# Patient Record
Sex: FEMALE | Race: WHITE | NOT HISPANIC OR LATINO | Employment: OTHER | URBAN - METROPOLITAN AREA
[De-identification: names, ages, dates, MRNs, and addresses within clinical notes are randomized per-mention and may not be internally consistent; named-entity substitution may affect disease eponyms.]

---

## 2017-11-13 ENCOUNTER — HOSPITAL ENCOUNTER (OUTPATIENT)
Dept: MAMMOGRAPHY | Facility: HOSPITAL | Age: 57
Discharge: HOME | End: 2017-11-13
Attending: FAMILY MEDICINE | Admitting: FAMILY MEDICINE

## 2019-02-12 ENCOUNTER — HOSPITAL ENCOUNTER (OUTPATIENT)
Dept: OTHER | Facility: HOSPITAL | Age: 59
Discharge: HOME | End: 2019-02-12
Attending: FAMILY MEDICINE | Admitting: FAMILY MEDICINE

## 2019-02-13 ENCOUNTER — CONVERSION ENCOUNTER (OUTPATIENT)
Dept: FAMILY MEDICINE CLINIC | Facility: CLINIC | Age: 59
End: 2019-02-13

## 2019-02-14 LAB
ALBUMIN SERPL-MCNC: 4.5 G/DL (ref 3.6–5.1)
ALP SERPL-CCNC: 71 U/L (ref 33–130)
AST SERPL-CCNC: 20 U/L (ref 10–35)
BILIRUB SERPL-MCNC: 0.7 MG/DL (ref 0.2–1.2)
BUN SERPL-MCNC: 16 MG/DL (ref 7–25)
BUN/CREAT SERPL: 20 (CALC) (ref 6–22)
CALCIUM SERPL-MCNC: 9.7 MG/DL (ref 8.6–10.2)
CHLORIDE SERPL-SCNC: 104 MMOL/L (ref 98–110)
CHOLEST SERPL-MCNC: 196 MG/DL (ref 125–200)
CONV CO2: 23 MMOL/L (ref 21–33)
CONV TOTAL PROTEIN: 7 G/DL (ref 6.2–8.3)
CREAT UR-MCNC: 0.8 MG/DL (ref 0.6–1.1)
GLOBULIN UR ELPH-MCNC: 2.5 MG/DL (ref 2.2–3.9)
GLUCOSE UR QL: 97 MG/DL (ref 65–99)
HDLC SERPL-MCNC: 53 MG/DL
INSULIN SERPL-ACNC: 1.8 (CALC) (ref 1–2.1)
LDLC SERPL CALC-MCNC: 118 MG/DL
POTASSIUM SERPL-SCNC: 4.2 MMOL/L (ref 3.5–5.3)
SODIUM SERPL-SCNC: 138 MMOL/L (ref 135–146)
TRIGL SERPL-MCNC: 124 MG/DL
VIT B12 SERPL-MCNC: 418 PG/ML (ref 200–1100)

## 2019-07-10 ENCOUNTER — OFFICE VISIT (OUTPATIENT)
Dept: FAMILY MEDICINE CLINIC | Facility: CLINIC | Age: 59
End: 2019-07-10

## 2019-07-10 VITALS
SYSTOLIC BLOOD PRESSURE: 112 MMHG | BODY MASS INDEX: 35.06 KG/M2 | WEIGHT: 178.6 LBS | RESPIRATION RATE: 18 BRPM | TEMPERATURE: 98.7 F | HEIGHT: 60 IN | DIASTOLIC BLOOD PRESSURE: 78 MMHG | OXYGEN SATURATION: 98 % | HEART RATE: 87 BPM

## 2019-07-10 DIAGNOSIS — J20.9 ACUTE BRONCHITIS, UNSPECIFIED ORGANISM: Primary | ICD-10-CM

## 2019-07-10 PROCEDURE — 99213 OFFICE O/P EST LOW 20 MIN: CPT | Performed by: FAMILY MEDICINE

## 2019-07-10 RX ORDER — CEPHALEXIN 500 MG/1
500 CAPSULE ORAL 3 TIMES DAILY
Qty: 30 CAPSULE | Refills: 0 | Status: SHIPPED | OUTPATIENT
Start: 2019-07-10 | End: 2019-07-20

## 2019-07-10 RX ORDER — GABAPENTIN 100 MG/1
100 CAPSULE ORAL DAILY
Refills: 3 | COMMUNITY
Start: 2019-06-30 | End: 2019-11-04 | Stop reason: SDUPTHER

## 2019-07-10 RX ORDER — BENZONATATE 100 MG/1
200 CAPSULE ORAL 3 TIMES DAILY PRN
Qty: 30 CAPSULE | Refills: 0 | Status: SHIPPED | OUTPATIENT
Start: 2019-07-10 | End: 2021-02-23

## 2019-07-10 RX ORDER — CHOLECALCIFEROL (VITAMIN D3) 125 MCG
500 CAPSULE ORAL DAILY
COMMUNITY
Start: 2019-07-10

## 2019-07-10 RX ORDER — SIMVASTATIN 20 MG
20 TABLET ORAL EVERY EVENING
Refills: 4 | COMMUNITY
Start: 2019-06-06 | End: 2019-11-07 | Stop reason: DRUGHIGH

## 2019-07-10 RX ORDER — ALBUTEROL SULFATE 90 UG/1
2 AEROSOL, METERED RESPIRATORY (INHALATION) EVERY 4 HOURS PRN
Refills: 2 | COMMUNITY
Start: 2019-04-05 | End: 2019-11-04 | Stop reason: SDUPTHER

## 2019-07-10 RX ORDER — ALBUTEROL SULFATE 2.5 MG/3ML
1 SOLUTION RESPIRATORY (INHALATION) 4 TIMES DAILY PRN
COMMUNITY
Start: 2019-05-16 | End: 2020-03-03

## 2019-07-16 DIAGNOSIS — K21.9 GASTROESOPHAGEAL REFLUX DISEASE WITHOUT ESOPHAGITIS: Primary | ICD-10-CM

## 2019-07-16 RX ORDER — OMEPRAZOLE 20 MG/1
20 CAPSULE, DELAYED RELEASE ORAL DAILY
Qty: 30 CAPSULE | Refills: 12 | Status: SHIPPED | OUTPATIENT
Start: 2019-07-16 | End: 2019-11-04 | Stop reason: SDUPTHER

## 2019-11-04 ENCOUNTER — OFFICE VISIT (OUTPATIENT)
Dept: FAMILY MEDICINE CLINIC | Facility: CLINIC | Age: 59
End: 2019-11-04

## 2019-11-04 VITALS
BODY MASS INDEX: 33 KG/M2 | SYSTOLIC BLOOD PRESSURE: 130 MMHG | DIASTOLIC BLOOD PRESSURE: 72 MMHG | OXYGEN SATURATION: 95 % | WEIGHT: 174.8 LBS | HEART RATE: 102 BPM | RESPIRATION RATE: 18 BRPM | HEIGHT: 61 IN | TEMPERATURE: 97.2 F

## 2019-11-04 DIAGNOSIS — E78.2 MIXED HYPERLIPIDEMIA: ICD-10-CM

## 2019-11-04 DIAGNOSIS — F17.200 TOBACCO USE DISORDER: ICD-10-CM

## 2019-11-04 DIAGNOSIS — Z00.01 ANNUAL VISIT FOR GENERAL ADULT MEDICAL EXAMINATION WITH ABNORMAL FINDINGS: ICD-10-CM

## 2019-11-04 DIAGNOSIS — K21.9 GASTROESOPHAGEAL REFLUX DISEASE WITHOUT ESOPHAGITIS: ICD-10-CM

## 2019-11-04 DIAGNOSIS — J41.0 SIMPLE CHRONIC BRONCHITIS (HCC): Primary | ICD-10-CM

## 2019-11-04 DIAGNOSIS — Z78.0 ASYMPTOMATIC MENOPAUSAL STATE: ICD-10-CM

## 2019-11-04 DIAGNOSIS — M17.11 OSTEOARTHRITIS OF RIGHT KNEE, UNSPECIFIED OSTEOARTHRITIS TYPE: ICD-10-CM

## 2019-11-04 DIAGNOSIS — Z11.59 NEED FOR HEPATITIS C SCREENING TEST: ICD-10-CM

## 2019-11-04 DIAGNOSIS — Z12.39 ENCOUNTER FOR SCREENING FOR MALIGNANT NEOPLASM OF BREAST: ICD-10-CM

## 2019-11-04 DIAGNOSIS — R92.2 DENSE BREAST: ICD-10-CM

## 2019-11-04 DIAGNOSIS — Z12.11 COLON CANCER SCREENING: ICD-10-CM

## 2019-11-04 DIAGNOSIS — Z12.4 CERVICAL CANCER SCREENING: ICD-10-CM

## 2019-11-04 PROBLEM — R92.30 DENSE BREAST: Status: ACTIVE | Noted: 2019-11-04

## 2019-11-04 LAB
BILIRUB BLD-MCNC: NEGATIVE MG/DL
CLARITY, POC: CLEAR
COLOR UR: YELLOW
GLUCOSE UR STRIP-MCNC: NEGATIVE MG/DL
KETONES UR QL: NEGATIVE
LEUKOCYTE EST, POC: NEGATIVE
NITRITE UR-MCNC: NEGATIVE MG/ML
PH UR: 8 [PH] (ref 5–8)
PROT UR STRIP-MCNC: NEGATIVE MG/DL
RBC # UR STRIP: NEGATIVE /UL
SP GR UR: 1 (ref 1–1.03)
UROBILINOGEN UR QL: NORMAL

## 2019-11-04 PROCEDURE — 81002 URINALYSIS NONAUTO W/O SCOPE: CPT | Performed by: FAMILY MEDICINE

## 2019-11-04 PROCEDURE — 99212 OFFICE O/P EST SF 10 MIN: CPT | Performed by: FAMILY MEDICINE

## 2019-11-04 PROCEDURE — 99396 PREV VISIT EST AGE 40-64: CPT | Performed by: FAMILY MEDICINE

## 2019-11-04 RX ORDER — OMEPRAZOLE 20 MG/1
20 CAPSULE, DELAYED RELEASE ORAL DAILY
Qty: 90 CAPSULE | Refills: 3 | Status: SHIPPED | OUTPATIENT
Start: 2019-11-04 | End: 2020-08-03

## 2019-11-04 RX ORDER — GABAPENTIN 100 MG/1
100 CAPSULE ORAL DAILY
Qty: 90 CAPSULE | Refills: 3 | Status: SHIPPED | OUTPATIENT
Start: 2019-11-04 | End: 2020-12-11

## 2019-11-04 RX ORDER — ALBUTEROL SULFATE 90 UG/1
2 AEROSOL, METERED RESPIRATORY (INHALATION) EVERY 4 HOURS PRN
Qty: 3 INHALER | Refills: 2 | Status: SHIPPED | OUTPATIENT
Start: 2019-11-04 | End: 2021-04-12 | Stop reason: SDUPTHER

## 2019-11-04 RX ORDER — CHLORAL HYDRATE 500 MG
1000 CAPSULE ORAL
COMMUNITY
End: 2020-12-17

## 2019-11-04 NOTE — PROGRESS NOTES
Subjective   Tara Cisse is a 58 y.o. female.     Chief Complaint   Patient presents with   • Annual Exam       The patient is here: for coordination of medical care to discuss health maintenance and disease prevention. Overall has: moderate activity with work/home activities, good appetite, feels well with no complaints, good energy level and is sleeping well. Nutrition: inappropriate diet. Last tetanus shot was unknown.     Hyperlipidemia   This is a chronic problem. The current episode started more than 1 year ago. Recent lipid tests were reviewed and are low. Exacerbating diseases include obesity. She has no history of diabetes. Pertinent negatives include no chest pain, myalgias or shortness of breath. Current antihyperlipidemic treatment includes statins. The current treatment provides significant improvement of lipids. Risk factors for coronary artery disease include dyslipidemia, obesity, post-menopausal and family history.   Heartburn   She reports no abdominal pain, no chest pain, no coughing, no nausea or no wheezing. This is a chronic problem. The problem occurs occasionally. The problem has been resolved. The symptoms are aggravated by certain foods. Pertinent negatives include no weight loss. There are no known risk factors. She has tried a PPI for the symptoms. The treatment provided significant relief.   COPD   There is no cough, shortness of breath or wheezing. This is a chronic problem. The current episode started more than 1 year ago. The problem occurs intermittently. The problem has been unchanged. Pertinent negatives include no chest pain, ear pain, fever, myalgias, rhinorrhea or weight loss. Her symptoms are aggravated by change in weather, exposure to smoke and URI. Relieved by: meds. She reports significant improvement on treatment.        I personally reviewed and updated the patient's allergies, medications, problem list, and past medical, surgical, social, and family history.      Allergies:  Allergies   Allergen Reactions   • Augmentin [Amoxicillin-Pot Clavulanate] Nausea And Vomiting   • Sulfa Antibiotics Nausea And Vomiting       Social History:  Social History     Socioeconomic History   • Marital status:      Spouse name: Not on file   • Number of children: Not on file   • Years of education: Not on file   • Highest education level: Not on file   Tobacco Use   • Smoking status: Current Every Day Smoker     Packs/day: 1.00     Years: 39.00     Pack years: 39.00     Types: Cigarettes     Start date: 1980   • Smokeless tobacco: Never Used   Substance and Sexual Activity   • Alcohol use: No     Frequency: Never   • Sexual activity: Yes     Partners: Male       Family History:  Family History   Problem Relation Age of Onset   • Hyperlipidemia Mother    • Diabetes Father    • Heart failure Father    • Hyperlipidemia Father    • Heart failure Paternal Grandmother        Past Medical History :  Patient Active Problem List   Diagnosis   • Anemia due to vitamin B12 deficiency   • Vitamin B 12 deficiency   • Depressive disorder   • Gastroesophageal reflux disease without esophagitis   • Mixed hyperlipidemia   • Osteoarthritis of right knee   • Simple chronic bronchitis (CMS/HCC)   • Tobacco use disorder   • Annual visit for general adult medical examination with abnormal findings   • Encounter for screening for malignant neoplasm of breast   • Dense breast   • Asymptomatic menopausal state   • Colon cancer screening   • Cervical cancer screening       Medication List:    Current Outpatient Medications:   •  albuterol (PROVENTIL) (2.5 MG/3ML) 0.083% nebulizer solution, Take 1 mL by nebulization 4 (Four) Times a Day As Needed., Disp: , Rfl:   •  benzonatate (TESSALON PERLES) 100 MG capsule, Take 2 capsules by mouth 3 (Three) Times a Day As Needed for Cough., Disp: 30 capsule, Rfl: 0  •  gabapentin (NEURONTIN) 100 MG capsule, Take 1 capsule by mouth Daily., Disp: 90 capsule, Rfl: 3  •   loratadine-pseudoephedrine (CLARITIN-D 24-hour)  MG per 24 hr tablet, Take 1 tablet by mouth Daily., Disp: , Rfl:   •  Omega-3 Fatty Acids (FISH OIL) 1000 MG capsule capsule, Take 1,000 mg by mouth Daily With Breakfast., Disp: , Rfl:   •  omeprazole (priLOSEC) 20 MG capsule, Take 1 capsule by mouth Daily., Disp: 90 capsule, Rfl: 3  •  simvastatin (ZOCOR) 20 MG tablet, Take 20 mg by mouth Every Evening., Disp: , Rfl: 4  •  VENTOLIN  (90 Base) MCG/ACT inhaler, Inhale 2 puffs Every 4 (Four) Hours As Needed for Wheezing or Shortness of Air., Disp: 3 inhaler, Rfl: 2  •  vitamin B-12 (CYANOCOBALAMIN) 500 MCG tablet, Take 500 mcg by mouth Daily. Vitamin B12 deficiency, Disp: , Rfl:     Past Surgical History:  Past Surgical History:   Procedure Laterality Date   • ESSURE TUBAL LIGATION Bilateral    • TOTAL ABDOMINAL HYSTERECTOMY      ovaries remain, benign reasons       Depression Screen:   PHQ-2/PHQ-9 Depression Screening 7/10/2019   Little interest or pleasure in doing things 0   Feeling down, depressed, or hopeless 0   Total Score 0       Fall Risk Screen:  STEADI Fall Risk Assessment has not been completed.    Review Of Systems:  Review of Systems   Constitutional: Negative for activity change, fever and unexpected weight loss.   HENT: Negative for ear pain, rhinorrhea, sinus pressure and voice change.    Eyes: Negative for visual disturbance.   Respiratory: Negative for cough, shortness of breath and wheezing.    Cardiovascular: Negative for chest pain.   Gastrointestinal: Negative for abdominal pain, diarrhea, nausea and vomiting.   Endocrine: Negative for cold intolerance and heat intolerance.   Genitourinary: Negative for frequency and urgency.   Musculoskeletal: Negative for arthralgias and myalgias.   Skin: Negative for rash.   Neurological: Negative for syncope.   Hematological: Does not bruise/bleed easily.   Psychiatric/Behavioral: Negative for depressed mood. The patient is not nervous/anxious.   "      Vital Signs:  Visit Vitals  /72 (BP Location: Right arm, Patient Position: Sitting, Cuff Size: Adult)   Pulse 102   Temp 97.2 °F (36.2 °C) (Oral)   Resp 18   Ht 154.3 cm (60.75\")   Wt 79.3 kg (174 lb 12.8 oz)   LMP  (LMP Unknown)   SpO2 95% Comment: Room air   BMI 33.30 kg/m²       Physical Exam:  Physical Exam   Constitutional: She is oriented to person, place, and time. She appears well-developed and well-nourished. No distress.   HENT:   Head: Normocephalic and atraumatic.   Right Ear: External ear normal. No drainage or swelling. Tympanic membrane is not erythematous. No middle ear effusion. cerumen impaction is not present.  Left Ear: External ear normal. No drainage or swelling. Tympanic membrane is not erythematous.  No middle ear effusion. An impacted cerumen is not present.  Nose: Nose normal.   Mouth/Throat: Oropharynx is clear and moist. No oropharyngeal exudate.   Eyes: Conjunctivae and EOM are normal. Pupils are equal, round, and reactive to light. No scleral icterus.   Neck: Normal range of motion. Neck supple. No tracheal deviation present. No thyromegaly present.   Cardiovascular: Normal rate, regular rhythm, normal heart sounds and intact distal pulses. Exam reveals no friction rub.   No murmur heard.  Pulmonary/Chest: Effort normal and breath sounds normal. No stridor. No respiratory distress. She has no wheezes. She has no rales. Right breast exhibits no inverted nipple, no mass, no nipple discharge, no skin change and no tenderness. Left breast exhibits no inverted nipple, no mass, no nipple discharge, no skin change and no tenderness.   Abdominal: Soft. Bowel sounds are normal. She exhibits no distension and no mass. There is no tenderness. No hernia.   Genitourinary: Rectum normal. Rectal exam shows guaiac negative stool. Uterus is absent.   Cervix is absent. Right adnexum is absent.Left adnexum is absent.No erythema or tenderness in the vagina. No vaginal discharge found. "   Musculoskeletal: Normal range of motion. She exhibits no edema, tenderness or deformity.   Lymphadenopathy:     She has no cervical adenopathy.   Neurological: She is alert and oriented to person, place, and time. She has normal reflexes. She displays normal reflexes. No cranial nerve deficit or sensory deficit. She exhibits normal muscle tone. Coordination and gait normal.   Skin: Skin is warm and dry. No rash noted. She is not diaphoretic.   Psychiatric: She has a normal mood and affect. Her behavior is normal.   Vitals reviewed.        Assessment and Plan:  Problem List Items Addressed This Visit        Cardiovascular and Mediastinum    Mixed hyperlipidemia    Relevant Orders    CBC & Differential    Comprehensive Metabolic Panel    Lipid Panel With / Chol / HDL Ratio    TSH       Respiratory    Simple chronic bronchitis (CMS/HCC) - Primary    Relevant Medications    VENTOLIN  (90 Base) MCG/ACT inhaler       Digestive    Gastroesophageal reflux disease without esophagitis    Relevant Medications    omeprazole (priLOSEC) 20 MG capsule       Musculoskeletal and Integument    Osteoarthritis of right knee    Relevant Medications    gabapentin (NEURONTIN) 100 MG capsule       Genitourinary    Asymptomatic menopausal state    Overview     declines bone dexa scan            Other    Tobacco use disorder    Annual visit for general adult medical examination with abnormal findings    Relevant Orders    POCT urinalysis dipstick, manual (Completed)    Encounter for screening for malignant neoplasm of breast    Overview     Will schedule at [facility]  Last mammogram 11/13/2017. Heterogeneously dense         Relevant Orders    Mammo Screening Digital Tomosynthesis Bilateral With CAD    Dense breast    Overview     Tara Cisse's 5 year risk of having breast cancer is 0.8%. The average woman at age 56 has a risk of 1.5% of having breast cancer.  Tara's life time risk of having breast cancer up to age 90 is 5.3%. The  average woman's chance of having breast cancer up to the age of 90 is 10.0%.    We discussed the risk assessment values with Tara Cisse, she understands that she is at (less) than average risk of developing breast cancer at 5 years and over her life time than the average woman of her age. She has already had a discussion with oncology about her genetic risk factors.           Colon cancer screening    Overview     Accepted GSI packet         Cervical cancer screening    Overview     Last pap smear date unknown  S/p hysterectomy due to benign reasons           Other Visit Diagnoses     Need for hepatitis C screening test        Relevant Orders    Hepatitis C Antibody          An After Visit Summary and PPPS were given to the patient.

## 2019-11-05 LAB
ALBUMIN SERPL-MCNC: 4.8 G/DL (ref 3.5–5.5)
ALBUMIN/GLOB SERPL: 2.2 {RATIO} (ref 1.2–2.2)
ALP SERPL-CCNC: 91 IU/L (ref 39–117)
ALT SERPL-CCNC: 18 IU/L (ref 0–32)
AST SERPL-CCNC: 19 IU/L (ref 0–40)
BASOPHILS # BLD AUTO: 0.1 X10E3/UL (ref 0–0.2)
BASOPHILS NFR BLD AUTO: 1 %
BILIRUB SERPL-MCNC: 0.4 MG/DL (ref 0–1.2)
BUN SERPL-MCNC: 16 MG/DL (ref 6–24)
BUN/CREAT SERPL: 23 (ref 9–23)
CALCIUM SERPL-MCNC: 9.9 MG/DL (ref 8.7–10.2)
CHLORIDE SERPL-SCNC: 104 MMOL/L (ref 96–106)
CHOLEST SERPL-MCNC: 210 MG/DL (ref 100–199)
CHOLEST/HDLC SERPL: 4 RATIO (ref 0–4.4)
CO2 SERPL-SCNC: 21 MMOL/L (ref 20–29)
CREAT SERPL-MCNC: 0.71 MG/DL (ref 0.57–1)
EOSINOPHIL # BLD AUTO: 0.1 X10E3/UL (ref 0–0.4)
EOSINOPHIL NFR BLD AUTO: 1 %
ERYTHROCYTE [DISTWIDTH] IN BLOOD BY AUTOMATED COUNT: 13.6 % (ref 12.3–15.4)
GLOBULIN SER CALC-MCNC: 2.2 G/DL (ref 1.5–4.5)
GLUCOSE SERPL-MCNC: 98 MG/DL (ref 65–99)
HCT VFR BLD AUTO: 43.5 % (ref 34–46.6)
HCV AB S/CO SERPL IA: <0.1 S/CO RATIO (ref 0–0.9)
HDLC SERPL-MCNC: 52 MG/DL
HGB BLD-MCNC: 14.3 G/DL (ref 11.1–15.9)
IMM GRANULOCYTES # BLD AUTO: 0 X10E3/UL (ref 0–0.1)
IMM GRANULOCYTES NFR BLD AUTO: 0 %
LDLC SERPL CALC-MCNC: 126 MG/DL (ref 0–99)
LYMPHOCYTES # BLD AUTO: 2.9 X10E3/UL (ref 0.7–3.1)
LYMPHOCYTES NFR BLD AUTO: 35 %
MCH RBC QN AUTO: 30.1 PG (ref 26.6–33)
MCHC RBC AUTO-ENTMCNC: 32.9 G/DL (ref 31.5–35.7)
MCV RBC AUTO: 92 FL (ref 79–97)
MONOCYTES # BLD AUTO: 0.4 X10E3/UL (ref 0.1–0.9)
MONOCYTES NFR BLD AUTO: 4 %
NEUTROPHILS # BLD AUTO: 4.9 X10E3/UL (ref 1.4–7)
NEUTROPHILS NFR BLD AUTO: 59 %
PLATELET # BLD AUTO: 238 X10E3/UL (ref 150–450)
POTASSIUM SERPL-SCNC: 4.9 MMOL/L (ref 3.5–5.2)
PROT SERPL-MCNC: 7 G/DL (ref 6–8.5)
RBC # BLD AUTO: 4.75 X10E6/UL (ref 3.77–5.28)
SODIUM SERPL-SCNC: 144 MMOL/L (ref 134–144)
TRIGL SERPL-MCNC: 162 MG/DL (ref 0–149)
TSH SERPL DL<=0.005 MIU/L-ACNC: 0.63 UIU/ML (ref 0.45–4.5)
VLDLC SERPL CALC-MCNC: 32 MG/DL (ref 5–40)
WBC # BLD AUTO: 8.3 X10E3/UL (ref 3.4–10.8)

## 2019-11-07 ENCOUNTER — TELEPHONE (OUTPATIENT)
Dept: FAMILY MEDICINE CLINIC | Facility: CLINIC | Age: 59
End: 2019-11-07

## 2019-11-07 DIAGNOSIS — E78.2 MIXED HYPERLIPIDEMIA: Primary | ICD-10-CM

## 2019-11-07 RX ORDER — SIMVASTATIN 40 MG
40 TABLET ORAL NIGHTLY
Qty: 30 TABLET | Refills: 12 | Status: SHIPPED | OUTPATIENT
Start: 2019-11-07 | End: 2020-09-11 | Stop reason: SDUPTHER

## 2019-11-07 NOTE — TELEPHONE ENCOUNTER
----- Message from Roseanne Viera MD sent at 11/5/2019  8:49 PM EST -----  Her labs are ok except her cholesterol. Is she taking her medication? If so need to increase simvastatin to 40mg

## 2019-11-13 ENCOUNTER — TELEPHONE (OUTPATIENT)
Dept: FAMILY MEDICINE CLINIC | Facility: CLINIC | Age: 59
End: 2019-11-13

## 2019-12-02 ENCOUNTER — HOSPITAL ENCOUNTER (OUTPATIENT)
Dept: MAMMOGRAPHY | Facility: HOSPITAL | Age: 59
Discharge: HOME | End: 2019-12-02
Admitting: FAMILY MEDICINE

## 2019-12-02 PROCEDURE — 77067 SCR MAMMO BI INCL CAD: CPT

## 2019-12-02 PROCEDURE — 77063 BREAST TOMOSYNTHESIS BI: CPT

## 2020-01-14 ENCOUNTER — TELEPHONE (OUTPATIENT)
Dept: FAMILY MEDICINE CLINIC | Facility: CLINIC | Age: 60
End: 2020-01-14

## 2020-03-03 RX ORDER — ALBUTEROL SULFATE 2.5 MG/3ML
SOLUTION RESPIRATORY (INHALATION)
Qty: 75 ML | Refills: 12 | Status: SHIPPED | OUTPATIENT
Start: 2020-03-03 | End: 2021-03-29 | Stop reason: SDUPTHER

## 2020-04-07 ENCOUNTER — TELEPHONE (OUTPATIENT)
Dept: FAMILY MEDICINE CLINIC | Facility: CLINIC | Age: 60
End: 2020-04-07

## 2020-04-07 NOTE — TELEPHONE ENCOUNTER
.  Patient called with symptoms of a respiratory infection that have been present for 3 day and are mild  in nature. Symptom onset was gradual and is unchanged.     Symptoms include Headache  Patient has mild shortness of breath.     Patient denies Headache, Rhinorrhea, Sneezing, Cough nonproductive and Chest pain. Patient is able to perform their usual activities of daily living. Symptoms are aggravated by weather changes and high pollen counts. Patient has tried loratidine (Claritin). Treatment has been minimally effective.     Patient has traveled to a geographic area with community spread of COVID-19? no  Patient has had direct contact with a COVID-19 patient? no  Is patient 60 years old or older? no  Does patient have chronic lung disease, heart disease, chronic kidney disease, or diabetes? yes  Is patient immunocompromised or on an immunosuppressive medication?  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

## 2020-08-03 ENCOUNTER — OFFICE VISIT (OUTPATIENT)
Dept: FAMILY MEDICINE CLINIC | Facility: CLINIC | Age: 60
End: 2020-08-03

## 2020-08-03 ENCOUNTER — HOSPITAL ENCOUNTER (OUTPATIENT)
Dept: GENERAL RADIOLOGY | Facility: HOSPITAL | Age: 60
Discharge: HOME | End: 2020-08-03
Admitting: FAMILY MEDICINE

## 2020-08-03 VITALS
TEMPERATURE: 98.1 F | BODY MASS INDEX: 35.04 KG/M2 | OXYGEN SATURATION: 95 % | HEIGHT: 61 IN | HEART RATE: 87 BPM | WEIGHT: 185.6 LBS | RESPIRATION RATE: 16 BRPM | SYSTOLIC BLOOD PRESSURE: 138 MMHG | DIASTOLIC BLOOD PRESSURE: 84 MMHG

## 2020-08-03 DIAGNOSIS — M25.562 ACUTE PAIN OF LEFT KNEE: Primary | ICD-10-CM

## 2020-08-03 PROCEDURE — 99213 OFFICE O/P EST LOW 20 MIN: CPT | Performed by: FAMILY MEDICINE

## 2020-08-03 PROCEDURE — 73564 X-RAY EXAM KNEE 4 OR MORE: CPT

## 2020-08-03 NOTE — PROGRESS NOTES
Subjective   Tara Cisse is a 59 y.o. female.     Chief Complaint   Patient presents with   • Knee Pain       Knee Pain    There was no injury mechanism (started hurting years ago). The pain is present in the left knee. The quality of the pain is described as cramping and aching. The pain has been fluctuating since onset. She reports no foreign bodies present. The symptoms are aggravated by palpation. Treatments tried: ice hot  The treatment provided no relief.        The following portions of the patient's history were reviewed and updated as appropriate: allergies, current medications, past family history, past medical history, past social history, past surgical history and problem list.    Allergies:  Allergies   Allergen Reactions   • Augmentin [Amoxicillin-Pot Clavulanate] Nausea And Vomiting   • Sulfa Antibiotics Nausea And Vomiting       Social History:  Social History     Socioeconomic History   • Marital status:      Spouse name: Not on file   • Number of children: Not on file   • Years of education: Not on file   • Highest education level: Not on file   Tobacco Use   • Smoking status: Current Every Day Smoker     Packs/day: 1.00     Years: 39.00     Pack years: 39.00     Types: Cigarettes     Start date: 1980   • Smokeless tobacco: Never Used   Substance and Sexual Activity   • Alcohol use: No     Frequency: Never   • Sexual activity: Yes     Partners: Male       Family History:  Family History   Problem Relation Age of Onset   • Hyperlipidemia Mother    • Diabetes Father    • Heart failure Father    • Hyperlipidemia Father    • Heart failure Paternal Grandmother        Past Medical History :  Patient Active Problem List   Diagnosis   • Anemia due to vitamin B12 deficiency   • Vitamin B 12 deficiency   • Depressive disorder   • Gastroesophageal reflux disease without esophagitis   • Mixed hyperlipidemia   • Osteoarthritis of right knee   • Simple chronic bronchitis (CMS/HCC)   • Tobacco use  disorder   • Annual visit for general adult medical examination with abnormal findings   • Encounter for screening for malignant neoplasm of breast   • Dense breast   • Asymptomatic menopausal state   • Colon cancer screening   • Cervical cancer screening   • Acute pain of left knee       Medication List:    Current Outpatient Medications:   •  albuterol (PROVENTIL) (2.5 MG/3ML) 0.083% nebulizer solution, USE 1 VIAL IN NEBULIZER EVERY 4 TO 6 HOURS AS NEEDED, Disp: 75 mL, Rfl: 12  •  loratadine-pseudoephedrine (CLARITIN-D 24-hour)  MG per 24 hr tablet, Take 1 tablet by mouth Daily., Disp: , Rfl:   •  Omega-3 Fatty Acids (FISH OIL) 1000 MG capsule capsule, Take 1,000 mg by mouth Daily With Breakfast., Disp: , Rfl:   •  simvastatin (ZOCOR) 40 MG tablet, Take 1 tablet by mouth Every Night., Disp: 30 tablet, Rfl: 12  •  VENTOLIN  (90 Base) MCG/ACT inhaler, Inhale 2 puffs Every 4 (Four) Hours As Needed for Wheezing or Shortness of Air., Disp: 3 inhaler, Rfl: 2  •  vitamin B-12 (CYANOCOBALAMIN) 500 MCG tablet, Take 500 mcg by mouth Daily. Vitamin B12 deficiency, Disp: , Rfl:   •  benzonatate (TESSALON PERLES) 100 MG capsule, Take 2 capsules by mouth 3 (Three) Times a Day As Needed for Cough., Disp: 30 capsule, Rfl: 0  •  gabapentin (NEURONTIN) 100 MG capsule, Take 1 capsule by mouth Daily., Disp: 90 capsule, Rfl: 3    Past Surgical History:  Past Surgical History:   Procedure Laterality Date   • ESSURE TUBAL LIGATION Bilateral    • TOTAL ABDOMINAL HYSTERECTOMY      ovaries remain, benign reasons       Review of Systems:  Review of Systems   Constitutional: Negative for activity change and fever.   HENT: Negative for ear pain, rhinorrhea, sinus pressure and voice change.    Eyes: Negative for visual disturbance.   Respiratory: Negative for cough and shortness of breath.    Cardiovascular: Negative for chest pain.   Gastrointestinal: Negative for abdominal pain, diarrhea, nausea and vomiting.   Endocrine:  "Negative for cold intolerance and heat intolerance.   Genitourinary: Negative for frequency and urgency.   Musculoskeletal: Negative for arthralgias.   Skin: Negative for rash.   Neurological: Negative for syncope.   Hematological: Does not bruise/bleed easily.   Psychiatric/Behavioral: Negative for depressed mood. The patient is not nervous/anxious.        Physical Exam:  Vital Signs:  Visit Vitals  /84 (BP Location: Right arm)   Pulse 87   Temp 98.1 °F (36.7 °C) (Oral)   Resp 16   Ht 154.3 cm (60.75\")   Wt 84.2 kg (185 lb 9.6 oz)   LMP  (LMP Unknown)   SpO2 95%   BMI 35.36 kg/m²       Physical Exam   Constitutional: She is oriented to person, place, and time. She appears well-developed and well-nourished. She is cooperative.   Cardiovascular: Normal rate, regular rhythm and normal heart sounds. Exam reveals no gallop and no friction rub.   No murmur heard.  Pulmonary/Chest: Effort normal and breath sounds normal. She has no wheezes. She has no rales.   Musculoskeletal:        Left knee: She exhibits normal range of motion, no swelling and no laceration. Tenderness found. Patellar tendon tenderness noted. No medial joint line and no lateral joint line tenderness noted.   Neurological: She is alert and oriented to person, place, and time. Coordination normal.   Skin: Skin is warm and dry.   Psychiatric: She has a normal mood and affect.   Vitals reviewed.      Assessment and Plan:  Problem List Items Addressed This Visit        Musculoskeletal and Integument    Acute pain of left knee - Primary    Relevant Orders    XR Knee 4+ View Left (Completed)        Start gabapentin. Diagnosis, treatment and and course discussed. Potential side effects discussed. Return if there is worsening or persistence of symptoms.       An After Visit Summary and PPPS were given to the patient.             I wore protective equipment throughout this patient encounter to include mask and gloves. Hand hygiene was performed before " donning protective equipment and after removal when leaving the room.

## 2020-08-04 ENCOUNTER — TELEPHONE (OUTPATIENT)
Dept: FAMILY MEDICINE CLINIC | Facility: CLINIC | Age: 60
End: 2020-08-04

## 2020-08-04 NOTE — TELEPHONE ENCOUNTER
----- Message from Roseanne Viera MD sent at 8/4/2020  5:39 PM EDT -----  Xray is ok. Would she like to see an orthopedist or PT

## 2020-09-10 ENCOUNTER — TELEPHONE (OUTPATIENT)
Dept: FAMILY MEDICINE CLINIC | Facility: CLINIC | Age: 60
End: 2020-09-10

## 2020-09-10 DIAGNOSIS — E78.2 MIXED HYPERLIPIDEMIA: ICD-10-CM

## 2020-09-11 RX ORDER — SIMVASTATIN 40 MG
40 TABLET ORAL NIGHTLY
Qty: 30 TABLET | Refills: 12 | Status: SHIPPED | OUTPATIENT
Start: 2020-09-11 | End: 2021-06-20

## 2020-12-09 ENCOUNTER — TELEPHONE (OUTPATIENT)
Dept: FAMILY MEDICINE CLINIC | Facility: CLINIC | Age: 60
End: 2020-12-09

## 2020-12-09 DIAGNOSIS — M17.11 OSTEOARTHRITIS OF RIGHT KNEE, UNSPECIFIED OSTEOARTHRITIS TYPE: ICD-10-CM

## 2020-12-11 RX ORDER — GABAPENTIN 100 MG/1
CAPSULE ORAL
Qty: 90 CAPSULE | Refills: 0 | Status: SHIPPED | OUTPATIENT
Start: 2020-12-11 | End: 2021-12-14 | Stop reason: SDUPTHER

## 2020-12-17 ENCOUNTER — HOSPITAL ENCOUNTER (OUTPATIENT)
Dept: CT IMAGING | Facility: HOSPITAL | Age: 60
Discharge: HOME | End: 2020-12-17
Admitting: FAMILY MEDICINE

## 2020-12-17 ENCOUNTER — OFFICE VISIT (OUTPATIENT)
Dept: FAMILY MEDICINE CLINIC | Facility: CLINIC | Age: 60
End: 2020-12-17

## 2020-12-17 VITALS
WEIGHT: 185.6 LBS | TEMPERATURE: 97.7 F | HEIGHT: 61 IN | SYSTOLIC BLOOD PRESSURE: 122 MMHG | DIASTOLIC BLOOD PRESSURE: 68 MMHG | OXYGEN SATURATION: 96 % | RESPIRATION RATE: 16 BRPM | BODY MASS INDEX: 35.04 KG/M2 | HEART RATE: 124 BPM

## 2020-12-17 DIAGNOSIS — H53.9 VISUAL CHANGES: ICD-10-CM

## 2020-12-17 DIAGNOSIS — F17.200 TOBACCO USE DISORDER: ICD-10-CM

## 2020-12-17 DIAGNOSIS — R42 DIZZINESS: Primary | ICD-10-CM

## 2020-12-17 DIAGNOSIS — E78.2 MIXED HYPERLIPIDEMIA: ICD-10-CM

## 2020-12-17 DIAGNOSIS — R00.2 PALPITATIONS: ICD-10-CM

## 2020-12-17 PROCEDURE — 70450 CT HEAD/BRAIN W/O DYE: CPT

## 2020-12-17 PROCEDURE — 93000 ELECTROCARDIOGRAM COMPLETE: CPT | Performed by: FAMILY MEDICINE

## 2020-12-17 PROCEDURE — 99214 OFFICE O/P EST MOD 30 MIN: CPT | Performed by: FAMILY MEDICINE

## 2020-12-17 RX ORDER — LORATADINE 10 MG/1
10 TABLET ORAL DAILY
COMMUNITY
End: 2021-11-17

## 2020-12-17 NOTE — PROGRESS NOTES
Please let patient know that other than some mild sinus disease (not sinus infection), CT neg.  No indication of stroke.  We'll let her know what her labs look like when they return tomorrow.

## 2020-12-17 NOTE — PROGRESS NOTES
Chief Complaint   Patient presents with   • Dizziness   • Palpitations       Subjective   Tara Cisse is a 60 y.o. female.     Dizziness  This is a new problem. The current episode started today. Episode frequency: 1 x today. The problem has been gradually improving. Pertinent negatives include no abdominal pain, chest pain, chills, fever, nausea, neck pain, numbness, rash, vomiting or weakness. Nothing aggravates the symptoms. She has tried rest and lying down (30 mins) for the symptoms. The treatment provided moderate relief.   Palpitations   This is a recurrent problem. The current episode started today. The problem occurs 2 to 4 times per day. The problem has been unchanged. On average, each episode lasts 2 minutes. The symptoms are aggravated by caffeine and nicotine. Associated symptoms include dizziness. Pertinent negatives include no chest pain, fever, nausea, numbness, shortness of breath, vomiting or weakness. Treatments tried: lying down. The treatment provided moderate relief. Risk factors include stress and smoking/tobacco exposure.   Eye Problem   The right eye is affected. This is a new problem. The current episode started today. The problem has been resolved (lasted about 30 minutes). There was no injury mechanism. The patient is experiencing no pain. Associated symptoms include blurred vision. Pertinent negatives include no eye discharge, double vision, eye redness, fever, nausea, vomiting or weakness. She has tried nothing for the symptoms.      She reports increased caffeine intake recently.    I have reviewed and updated her medications, medical history and problem list during today's office visit.       Past Medical History :  Active Ambulatory Problems     Diagnosis Date Noted   • Anemia due to vitamin B12 deficiency 01/01/1983   • Vitamin B 12 deficiency    • Depressive disorder    • Gastroesophageal reflux disease without esophagitis    • Mixed hyperlipidemia    • Osteoarthritis of right  knee    • Simple chronic bronchitis (CMS/HCC)    • Tobacco use disorder    • Annual visit for general adult medical examination with abnormal findings 11/04/2019   • Encounter for screening for malignant neoplasm of breast 11/04/2019   • Dense breast 11/04/2019   • Asymptomatic menopausal state 11/04/2019   • Colon cancer screening 11/04/2019   • Cervical cancer screening 11/04/2019   • Acute pain of left knee 08/03/2020     Resolved Ambulatory Problems     Diagnosis Date Noted   • No Resolved Ambulatory Problems     No Additional Past Medical History       Medication List:    Current Outpatient Medications:   •  albuterol (PROVENTIL) (2.5 MG/3ML) 0.083% nebulizer solution, USE 1 VIAL IN NEBULIZER EVERY 4 TO 6 HOURS AS NEEDED, Disp: 75 mL, Rfl: 12  •  gabapentin (NEURONTIN) 100 MG capsule, Take 1 capsule by mouth once daily, Disp: 90 capsule, Rfl: 0  •  loratadine (Claritin) 10 MG tablet, Take 10 mg by mouth Daily., Disp: , Rfl:   •  simvastatin (ZOCOR) 40 MG tablet, Take 1 tablet by mouth Every Night., Disp: 30 tablet, Rfl: 12  •  VENTOLIN  (90 Base) MCG/ACT inhaler, Inhale 2 puffs Every 4 (Four) Hours As Needed for Wheezing or Shortness of Air., Disp: 3 inhaler, Rfl: 2  •  vitamin B-12 (CYANOCOBALAMIN) 500 MCG tablet, Take 500 mcg by mouth Daily. Vitamin B12 deficiency, Disp: , Rfl:       Allergies   Allergen Reactions   • Augmentin [Amoxicillin-Pot Clavulanate] Nausea And Vomiting   • Sulfa Antibiotics Nausea And Vomiting       Social History     Tobacco Use   • Smoking status: Current Every Day Smoker     Packs/day: 1.00     Years: 39.00     Pack years: 39.00     Types: Cigarettes     Start date: 1980   • Smokeless tobacco: Never Used   Substance Use Topics   • Alcohol use: No     Frequency: Never       Review of Systems   Constitutional: Negative for chills and fever.   HENT: Negative for ear pain (a few days ago, now resolved).    Eyes: Positive for blurred vision. Negative for double vision, discharge  "and redness.   Respiratory: Negative for shortness of breath.    Cardiovascular: Positive for palpitations. Negative for chest pain and leg swelling.   Gastrointestinal: Negative for abdominal pain, nausea and vomiting.   Musculoskeletal: Negative for neck pain and neck stiffness.   Skin: Negative for rash.   Neurological: Positive for dizziness and light-headedness (pre-syncopal). Negative for seizures, syncope, facial asymmetry, speech difficulty, weakness, numbness, headache and confusion.         Objective   Vitals:    12/17/20 1407   BP: 122/68   BP Location: Right arm   Patient Position: Sitting   Cuff Size: Adult   Pulse: (!) 124   Resp: 16   Temp: 97.7 °F (36.5 °C)   TempSrc: Skin   SpO2: 96%   Weight: 84.2 kg (185 lb 9.6 oz)   Height: 154.3 cm (60.75\")     Body mass index is 35.36 kg/m².    Physical Exam  Constitutional:       General: She is not in acute distress.     Appearance: Normal appearance. She is well-developed. She is obese. She is not ill-appearing or diaphoretic.   HENT:      Head: Normocephalic and atraumatic.      Nose: Nose normal.      Mouth/Throat:      Mouth: Mucous membranes are moist.   Eyes:      General: Lids are normal. No scleral icterus.        Right eye: No discharge.         Left eye: No discharge.      Extraocular Movements: Extraocular movements intact.      Conjunctiva/sclera: Conjunctivae normal.      Pupils: Pupils are equal, round, and reactive to light.   Neck:      Musculoskeletal: Normal range of motion and neck supple.      Thyroid: No thyromegaly.      Vascular: No carotid bruit or JVD.   Cardiovascular:      Rate and Rhythm: Regular rhythm. Tachycardia present.      Heart sounds: Normal heart sounds. No murmur. No gallop.    Pulmonary:      Effort: Pulmonary effort is normal.      Breath sounds: Normal breath sounds.   Abdominal:      Palpations: Abdomen is soft.      Tenderness: There is no guarding or rebound.   Musculoskeletal:      Right lower leg: No edema.      " Left lower leg: No edema.   Lymphadenopathy:      Cervical: No cervical adenopathy.   Skin:     General: Skin is warm.      Capillary Refill: Capillary refill takes less than 2 seconds.      Findings: No rash.   Neurological:      General: No focal deficit present.      Mental Status: She is alert and oriented to person, place, and time.      Cranial Nerves: Cranial nerves are intact. No cranial nerve deficit.      Motor: Motor function is intact.      Coordination: Coordination is intact.      Deep Tendon Reflexes:      Reflex Scores:       Patellar reflexes are 2+ on the right side and 2+ on the left side.  Psychiatric:         Mood and Affect: Mood normal.         Behavior: Behavior normal.         Thought Content: Thought content normal.           ECG 12 Lead    Date/Time: 12/17/2020 3:14 PM  Performed by: Salima Nguyen MD  Authorized by: Salima Nguyen MD   Comparison: not compared with previous ECG   Previous ECG: no previous ECG available  Rhythm: sinus rhythm  Rate: normal  BPM: 75  Conduction: conduction normal  ST Segments: ST segments normal  T Waves: T waves normal  QRS axis: normal  Other: no other findings    Clinical impression: normal ECG        Ct Head Without Contrast    Result Date: 12/17/2020  No acute intracranial abnormality. Mild mucosal thickening in ethmoid air cells. Brain MRI is more sensitive to evaluate for acute or subacute infarcts and to evaluate for intracranial metastatic disease.  Electronically Signed By-Rebecca Lozada MD On:12/17/2020 4:13 PM This report was finalized on [id number] by  Rebecca Lozada MD.      Assessment/Plan     Diagnoses and all orders for this visit:    1. Dizziness (Primary)  -     Comprehensive Metabolic Panel  -     CBC & Differential  -     CT Head Without Contrast    2. Palpitations  -     TSH  -     Magnesium  -     ECG 12 Lead    3. Mixed hyperlipidemia  -     Lipid Panel    4. Visual changes  -     CT Head Without  Contrast    5. Tobacco use disorder    EKG and CT obtained in office.  EKG normal.  CT normal.  No indication of acute CVA.  Labs ordered.  Await results.  Patient advised to avoid caffeine intake.  Start aspirin 81 mg daily.  Smoking cessation.  She was also advised to present to ER for new/recurrent symptoms.    Return if symptoms worsen or fail to improve.     I wore protective equipment throughout this patient encounter to include mask and eye protection. Hand hygiene was performed before donning protective equipment and after removal when leaving the room.  Patient also wore a mask.      Recent Results (from the past 336 hour(s))   Comprehensive Metabolic Panel    Collection Time: 12/17/20  3:48 PM    Specimen: Blood   Result Value Ref Range    Glucose 100 (H) 65 - 99 mg/dL    BUN 13 6 - 24 mg/dL    Creatinine 0.81 0.57 - 1.00 mg/dL    eGFR Non African Am 80 >59 mL/min/1.73    eGFR African Am 93 >59 mL/min/1.73    BUN/Creatinine Ratio 16 9 - 23    Sodium 141 134 - 144 mmol/L    Potassium 4.2 3.5 - 5.2 mmol/L    Chloride 103 96 - 106 mmol/L    Total CO2 24 20 - 29 mmol/L    Calcium 10.0 8.7 - 10.2 mg/dL    Total Protein 7.7 6.0 - 8.5 g/dL    Albumin 4.9 3.8 - 4.9 g/dL    Globulin 2.8 1.5 - 4.5 g/dL    A/G Ratio 1.8 1.2 - 2.2    Total Bilirubin 0.4 0.0 - 1.2 mg/dL    Alkaline Phosphatase 105 39 - 117 IU/L    AST (SGOT) 23 0 - 40 IU/L    ALT (SGPT) 22 0 - 32 IU/L   Lipid Panel    Collection Time: 12/17/20  3:48 PM    Specimen: Blood   Result Value Ref Range    Total Cholesterol 207 (H) 100 - 199 mg/dL    Triglycerides 173 (H) 0 - 149 mg/dL    HDL Cholesterol 47 >39 mg/dL    VLDL Cholesterol Amari 31 5 - 40 mg/dL    LDL Chol Calc (NIH) 129 (H) 0 - 99 mg/dL   TSH    Collection Time: 12/17/20  3:48 PM    Specimen: Blood   Result Value Ref Range    TSH 0.371 (L) 0.450 - 4.500 uIU/mL   CBC & Differential    Collection Time: 12/17/20  3:48 PM    Specimen: Blood   Result Value Ref Range    WBC 11.3 (H) 3.4 - 10.8 x10E3/uL     RBC 4.90 3.77 - 5.28 x10E6/uL    Hemoglobin 15.2 11.1 - 15.9 g/dL    Hematocrit 43.9 34.0 - 46.6 %    MCV 90 79 - 97 fL    MCH 31.0 26.6 - 33.0 pg    MCHC 34.6 31.5 - 35.7 g/dL    RDW 12.2 11.7 - 15.4 %    Platelets 256 150 - 450 x10E3/uL    Neutrophil Rel % 75 Not Estab. %    Lymphocyte Rel % 19 Not Estab. %    Monocyte Rel % 5 Not Estab. %    Eosinophil Rel % 1 Not Estab. %    Basophil Rel % 0 Not Estab. %    Neutrophils Absolute 8.5 (H) 1.4 - 7.0 x10E3/uL    Lymphocytes Absolute 2.1 0.7 - 3.1 x10E3/uL    Monocytes Absolute 0.5 0.1 - 0.9 x10E3/uL    Eosinophils Absolute 0.1 0.0 - 0.4 x10E3/uL    Basophils Absolute 0.0 0.0 - 0.2 x10E3/uL    Immature Granulocyte Rel % 0 Not Estab. %    Immature Grans Absolute 0.0 0.0 - 0.1 x10E3/uL   Magnesium    Collection Time: 12/17/20  3:48 PM    Specimen: Blood   Result Value Ref Range    Magnesium 2.2 1.6 - 2.3 mg/dL

## 2020-12-18 ENCOUNTER — TELEPHONE (OUTPATIENT)
Dept: FAMILY MEDICINE CLINIC | Facility: CLINIC | Age: 60
End: 2020-12-18

## 2020-12-18 DIAGNOSIS — R79.89 LOW TSH LEVEL: Primary | ICD-10-CM

## 2020-12-18 LAB
ALBUMIN SERPL-MCNC: 4.9 G/DL (ref 3.8–4.9)
ALBUMIN/GLOB SERPL: 1.8 {RATIO} (ref 1.2–2.2)
ALP SERPL-CCNC: 105 IU/L (ref 39–117)
ALT SERPL-CCNC: 22 IU/L (ref 0–32)
AST SERPL-CCNC: 23 IU/L (ref 0–40)
BASOPHILS # BLD AUTO: 0 X10E3/UL (ref 0–0.2)
BASOPHILS NFR BLD AUTO: 0 %
BILIRUB SERPL-MCNC: 0.4 MG/DL (ref 0–1.2)
BUN SERPL-MCNC: 13 MG/DL (ref 6–24)
BUN/CREAT SERPL: 16 (ref 9–23)
CALCIUM SERPL-MCNC: 10 MG/DL (ref 8.7–10.2)
CHLORIDE SERPL-SCNC: 103 MMOL/L (ref 96–106)
CHOLEST SERPL-MCNC: 207 MG/DL (ref 100–199)
CO2 SERPL-SCNC: 24 MMOL/L (ref 20–29)
CREAT SERPL-MCNC: 0.81 MG/DL (ref 0.57–1)
EOSINOPHIL # BLD AUTO: 0.1 X10E3/UL (ref 0–0.4)
EOSINOPHIL NFR BLD AUTO: 1 %
ERYTHROCYTE [DISTWIDTH] IN BLOOD BY AUTOMATED COUNT: 12.2 % (ref 11.7–15.4)
GLOBULIN SER CALC-MCNC: 2.8 G/DL (ref 1.5–4.5)
GLUCOSE SERPL-MCNC: 100 MG/DL (ref 65–99)
HCT VFR BLD AUTO: 43.9 % (ref 34–46.6)
HDLC SERPL-MCNC: 47 MG/DL
HGB BLD-MCNC: 15.2 G/DL (ref 11.1–15.9)
IMM GRANULOCYTES # BLD AUTO: 0 X10E3/UL (ref 0–0.1)
IMM GRANULOCYTES NFR BLD AUTO: 0 %
LDLC SERPL CALC-MCNC: 129 MG/DL (ref 0–99)
LYMPHOCYTES # BLD AUTO: 2.1 X10E3/UL (ref 0.7–3.1)
LYMPHOCYTES NFR BLD AUTO: 19 %
MAGNESIUM SERPL-MCNC: 2.2 MG/DL (ref 1.6–2.3)
MCH RBC QN AUTO: 31 PG (ref 26.6–33)
MCHC RBC AUTO-ENTMCNC: 34.6 G/DL (ref 31.5–35.7)
MCV RBC AUTO: 90 FL (ref 79–97)
MONOCYTES # BLD AUTO: 0.5 X10E3/UL (ref 0.1–0.9)
MONOCYTES NFR BLD AUTO: 5 %
NEUTROPHILS # BLD AUTO: 8.5 X10E3/UL (ref 1.4–7)
NEUTROPHILS NFR BLD AUTO: 75 %
PLATELET # BLD AUTO: 256 X10E3/UL (ref 150–450)
POTASSIUM SERPL-SCNC: 4.2 MMOL/L (ref 3.5–5.2)
PROT SERPL-MCNC: 7.7 G/DL (ref 6–8.5)
RBC # BLD AUTO: 4.9 X10E6/UL (ref 3.77–5.28)
SODIUM SERPL-SCNC: 141 MMOL/L (ref 134–144)
TRIGL SERPL-MCNC: 173 MG/DL (ref 0–149)
TSH SERPL DL<=0.005 MIU/L-ACNC: 0.37 UIU/ML (ref 0.45–4.5)
VLDLC SERPL CALC-MCNC: 31 MG/DL (ref 5–40)
WBC # BLD AUTO: 11.3 X10E3/UL (ref 3.4–10.8)

## 2020-12-18 NOTE — TELEPHONE ENCOUNTER
Spoke to pt 12/18/2020, 09:04am advised pt of lab  And CT results per Dr. Nguyen.  She will go to [facility] today for additional labs.

## 2020-12-18 NOTE — TELEPHONE ENCOUNTER
----- Message from Salima Nguyen MD sent at 12/17/2020  4:25 PM EST -----  Please let patient know that other than some mild sinus disease (not sinus infection), CT neg.  No indication of stroke.  We'll let her know what her labs look like when they return tomorrow.

## 2020-12-22 ENCOUNTER — TELEPHONE (OUTPATIENT)
Dept: FAMILY MEDICINE CLINIC | Facility: CLINIC | Age: 60
End: 2020-12-22

## 2020-12-22 NOTE — TELEPHONE ENCOUNTER
Please f/u with patient and see if she is still interested in proceeding with additional thyroid labs.  Thanks.

## 2020-12-30 LAB
T3FREE SERPL-MCNC: 3.2 PG/ML (ref 2–4.4)
T4 FREE SERPL-MCNC: 1.12 NG/DL (ref 0.82–1.77)
THYROPEROXIDASE AB SERPL-ACNC: <9 IU/ML (ref 0–34)
TSH RECEP AB SER-ACNC: <1.1 IU/L (ref 0–1.75)
TSI SER-ACNC: <0.1 IU/L (ref 0–0.55)
WRITTEN AUTHORIZATION: NORMAL

## 2021-01-08 ENCOUNTER — TELEPHONE (OUTPATIENT)
Dept: FAMILY MEDICINE CLINIC | Facility: CLINIC | Age: 61
End: 2021-01-08

## 2021-01-08 NOTE — TELEPHONE ENCOUNTER
----- Message from Salima Nguyen MD sent at 12/30/2020  9:40 AM EST -----  Please let patient know that all additional thyroid lab tests are normal.  F/U in office if symptoms fail to improve.

## 2021-02-23 ENCOUNTER — OFFICE VISIT (OUTPATIENT)
Dept: FAMILY MEDICINE CLINIC | Facility: CLINIC | Age: 61
End: 2021-02-23

## 2021-02-23 VITALS
SYSTOLIC BLOOD PRESSURE: 122 MMHG | HEIGHT: 61 IN | WEIGHT: 187.4 LBS | BODY MASS INDEX: 35.38 KG/M2 | DIASTOLIC BLOOD PRESSURE: 64 MMHG | HEART RATE: 110 BPM | TEMPERATURE: 97.5 F | RESPIRATION RATE: 18 BRPM | OXYGEN SATURATION: 97 %

## 2021-02-23 DIAGNOSIS — Z71.85 VACCINE COUNSELING: Primary | ICD-10-CM

## 2021-02-23 DIAGNOSIS — E66.01 MORBIDLY OBESE (HCC): ICD-10-CM

## 2021-02-23 PROCEDURE — 99212 OFFICE O/P EST SF 10 MIN: CPT | Performed by: FAMILY MEDICINE

## 2021-02-23 NOTE — PROGRESS NOTES
Subjective   Tara Cisse is a 60 y.o. female.     Chief Complaint   Patient presents with   • Advice Only       I advised Tara of the benefits and risks of the covid vaccines that are out right now. I provided her with education about the vaccines.   During this visit, I spent 15 minutes counseling the patient.       I personally reviewed and updated the patient's allergies, medications, problem list, and past medical, surgical, social, and family history. I have reviewed and confirmed the accuracy of the History of Present Illness and Review of Symptoms as documented by the MA/LPN/RN. Roseanne Viera MD    Allergies:  Allergies   Allergen Reactions   • Augmentin [Amoxicillin-Pot Clavulanate] Nausea And Vomiting   • Sulfa Antibiotics Nausea And Vomiting       Social History:  Social History     Socioeconomic History   • Marital status:      Spouse name: Not on file   • Number of children: Not on file   • Years of education: Not on file   • Highest education level: Not on file   Tobacco Use   • Smoking status: Current Every Day Smoker     Packs/day: 1.00     Years: 39.00     Pack years: 39.00     Types: Cigarettes     Start date: 1980   • Smokeless tobacco: Never Used   Substance and Sexual Activity   • Alcohol use: No     Frequency: Never   • Sexual activity: Yes     Partners: Male       Family History:  Family History   Problem Relation Age of Onset   • Hyperlipidemia Mother    • Diabetes Father    • Heart failure Father    • Hyperlipidemia Father    • Heart failure Paternal Grandmother        Past Medical History :  Patient Active Problem List   Diagnosis   • Anemia due to vitamin B12 deficiency   • Vitamin B 12 deficiency   • Depressive disorder   • Gastroesophageal reflux disease without esophagitis   • Mixed hyperlipidemia   • Osteoarthritis of right knee   • Simple chronic bronchitis (CMS/HCC)   • Tobacco use disorder   • Annual visit for general adult medical examination with abnormal findings    • Encounter for screening for malignant neoplasm of breast   • Dense breast   • Asymptomatic menopausal state   • Colon cancer screening   • Cervical cancer screening   • Acute pain of left knee   • Vaccine counseling   • Morbidly obese (CMS/HCC)       Medication List:    Current Outpatient Medications:   •  albuterol (PROVENTIL) (2.5 MG/3ML) 0.083% nebulizer solution, USE 1 VIAL IN NEBULIZER EVERY 4 TO 6 HOURS AS NEEDED, Disp: 75 mL, Rfl: 12  •  gabapentin (NEURONTIN) 100 MG capsule, Take 1 capsule by mouth once daily, Disp: 90 capsule, Rfl: 0  •  loratadine (Claritin) 10 MG tablet, Take 10 mg by mouth Daily., Disp: , Rfl:   •  simvastatin (ZOCOR) 40 MG tablet, Take 1 tablet by mouth Every Night., Disp: 30 tablet, Rfl: 12  •  VENTOLIN  (90 Base) MCG/ACT inhaler, Inhale 2 puffs Every 4 (Four) Hours As Needed for Wheezing or Shortness of Air., Disp: 3 inhaler, Rfl: 2  •  vitamin B-12 (CYANOCOBALAMIN) 500 MCG tablet, Take 500 mcg by mouth Daily. Vitamin B12 deficiency, Disp: , Rfl:     Past Surgical History:  Past Surgical History:   Procedure Laterality Date   • ESSURE TUBAL LIGATION Bilateral    • TOTAL ABDOMINAL HYSTERECTOMY      ovaries remain, benign reasons       Review of Systems:  Review of Systems   Constitutional: Negative for activity change and fever.   HENT: Negative for ear pain, rhinorrhea, sinus pressure and voice change.    Eyes: Negative for visual disturbance.   Respiratory: Negative for cough and shortness of breath.    Cardiovascular: Negative for chest pain.   Gastrointestinal: Negative for abdominal pain, diarrhea, nausea and vomiting.   Endocrine: Negative for cold intolerance and heat intolerance.   Genitourinary: Negative for frequency and urgency.   Musculoskeletal: Negative for arthralgias.   Skin: Negative for rash.   Neurological: Negative for syncope.   Hematological: Does not bruise/bleed easily.   Psychiatric/Behavioral: Negative for depressed mood. The patient is not  "nervous/anxious.        Physical Exam:  Vital Signs:  Vital Signs:   /64   Pulse 110   Temp 97.5 °F (36.4 °C)   Resp 18   Ht 154.3 cm (60.75\")   Wt 85 kg (187 lb 6.4 oz)   SpO2 97%   BMI 35.70 kg/m²             Physical Exam  Vitals signs reviewed.   Constitutional:       Appearance: Normal appearance. She is well-developed.   HENT:      Head: Normocephalic and atraumatic.   Eyes:      General:         Right eye: No discharge.         Left eye: No discharge.   Cardiovascular:      Rate and Rhythm: Normal rate and regular rhythm.      Heart sounds: Normal heart sounds. No murmur. No friction rub. No gallop.    Pulmonary:      Effort: Pulmonary effort is normal. No respiratory distress.      Breath sounds: Normal breath sounds. No wheezing or rales.   Skin:     General: Skin is warm and dry.      Findings: No rash.   Neurological:      Mental Status: She is alert and oriented to person, place, and time.      Coordination: Coordination normal.      Gait: Gait normal.   Psychiatric:         Behavior: Behavior is cooperative.         Assessment and Plan:  Problems Addressed this Visit        Endocrine and Metabolic    Morbidly obese (CMS/HCC)       Other    Vaccine counseling - Primary     Discussed the corona virus vaccines and their risks and benefits. She wants to wait           Diagnoses       Codes Comments    Vaccine counseling    -  Primary ICD-10-CM: Z71.89  ICD-9-CM: V65.49     Morbidly obese (CMS/HCC)     ICD-10-CM: E66.01  ICD-9-CM: 278.01            An After Visit Summary and PPPS were given to the patient.           Expected course, medications, and adverse effects discussed.  Call or return if worsening or persistent symptoms.  I wore protective equipment throughout this patient encounter including a mask, gloves, and eye protection.  Hand hygiene was performed before donning protective equipment and after removal when leaving the room. The complete contents of the Assessment and Plan and " Data/Lab Results as documented above have been reviewed and addressed by myself with the patient today as part of an ongoing evaluation / treatment plan.  If some of the documentation has been copied from a previous note and is unchanged it indicates that this problem / plan has been assessed today but is stable from a previous visit and no changes have been recommended.

## 2021-03-24 PROCEDURE — U0003 INFECTIOUS AGENT DETECTION BY NUCLEIC ACID (DNA OR RNA); SEVERE ACUTE RESPIRATORY SYNDROME CORONAVIRUS 2 (SARS-COV-2) (CORONAVIRUS DISEASE [COVID-19]), AMPLIFIED PROBE TECHNIQUE, MAKING USE OF HIGH THROUGHPUT TECHNOLOGIES AS DESCRIBED BY CMS-2020-01-R: HCPCS | Performed by: NURSE PRACTITIONER

## 2021-03-26 ENCOUNTER — TELEPHONE (OUTPATIENT)
Dept: FAMILY MEDICINE CLINIC | Facility: CLINIC | Age: 61
End: 2021-03-26

## 2021-03-26 NOTE — TELEPHONE ENCOUNTER
Caller: Tara Cisse    Relationship to patient: Self    Best call back number: [phone]    Date of exposure: 3/21/21    Date of positive COVID19 test: 3/26/21    Date if possible COVID19 exposure: 3/21/21    COVID19 symptoms: 3/21/21    Date of initial quarantine: 3/21/21    Additional information or concerns: PATIENT HAS COPD AND WANTS TO KNOW IF THERE IS ANYTHING SHE NEEDS TO BE TAKING    What is the [id number] [place] [address] [facility] - [phone]  - [phone] FX

## 2021-03-29 ENCOUNTER — TELEPHONE (OUTPATIENT)
Dept: FAMILY MEDICINE CLINIC | Facility: CLINIC | Age: 61
End: 2021-03-29

## 2021-03-30 RX ORDER — ALBUTEROL SULFATE 2.5 MG/3ML
2.5 SOLUTION RESPIRATORY (INHALATION) EVERY 4 HOURS PRN
Qty: 75 ML | Refills: 12 | Status: SHIPPED | OUTPATIENT
Start: 2021-03-30 | End: 2022-04-05 | Stop reason: SDUPTHER

## 2021-04-12 ENCOUNTER — TELEPHONE (OUTPATIENT)
Dept: FAMILY MEDICINE CLINIC | Facility: CLINIC | Age: 61
End: 2021-04-12

## 2021-04-12 DIAGNOSIS — J41.0 SIMPLE CHRONIC BRONCHITIS (HCC): ICD-10-CM

## 2021-04-12 RX ORDER — ALBUTEROL SULFATE 90 UG/1
2 AEROSOL, METERED RESPIRATORY (INHALATION) EVERY 4 HOURS PRN
Qty: 6.7 G | Refills: 3 | Status: SHIPPED | OUTPATIENT
Start: 2021-04-12 | End: 2021-06-10

## 2021-04-15 ENCOUNTER — OFFICE VISIT (OUTPATIENT)
Dept: FAMILY MEDICINE CLINIC | Facility: CLINIC | Age: 61
End: 2021-04-15

## 2021-04-15 VITALS
RESPIRATION RATE: 18 BRPM | DIASTOLIC BLOOD PRESSURE: 72 MMHG | BODY MASS INDEX: 34.21 KG/M2 | OXYGEN SATURATION: 97 % | WEIGHT: 181.2 LBS | HEIGHT: 61 IN | HEART RATE: 104 BPM | TEMPERATURE: 97.1 F | SYSTOLIC BLOOD PRESSURE: 112 MMHG

## 2021-04-15 DIAGNOSIS — R07.89 COSTOCHONDRAL CHEST PAIN: ICD-10-CM

## 2021-04-15 DIAGNOSIS — K21.9 GASTROESOPHAGEAL REFLUX DISEASE WITHOUT ESOPHAGITIS: ICD-10-CM

## 2021-04-15 DIAGNOSIS — J01.00 ACUTE NON-RECURRENT MAXILLARY SINUSITIS: ICD-10-CM

## 2021-04-15 DIAGNOSIS — F17.200 TOBACCO USE DISORDER: ICD-10-CM

## 2021-04-15 DIAGNOSIS — E66.09 CLASS 1 OBESITY DUE TO EXCESS CALORIES WITHOUT SERIOUS COMORBIDITY WITH BODY MASS INDEX (BMI) OF 34.0 TO 34.9 IN ADULT: ICD-10-CM

## 2021-04-15 DIAGNOSIS — R00.2 PALPITATIONS: Primary | ICD-10-CM

## 2021-04-15 PROBLEM — M25.562 ACUTE PAIN OF LEFT KNEE: Status: RESOLVED | Noted: 2020-08-03 | Resolved: 2021-04-15

## 2021-04-15 PROCEDURE — 93000 ELECTROCARDIOGRAM COMPLETE: CPT | Performed by: FAMILY MEDICINE

## 2021-04-15 PROCEDURE — 99214 OFFICE O/P EST MOD 30 MIN: CPT | Performed by: FAMILY MEDICINE

## 2021-04-15 RX ORDER — CEPHALEXIN 500 MG/1
500 CAPSULE ORAL 3 TIMES DAILY
Qty: 30 CAPSULE | Refills: 0 | Status: CANCELLED | OUTPATIENT
Start: 2021-04-15

## 2021-04-15 RX ORDER — OMEPRAZOLE 40 MG/1
40 CAPSULE, DELAYED RELEASE ORAL DAILY
Qty: 30 CAPSULE | Refills: 12 | Status: SHIPPED | OUTPATIENT
Start: 2021-04-15 | End: 2022-04-19 | Stop reason: SDUPTHER

## 2021-04-15 RX ORDER — CEPHALEXIN 500 MG/1
500 CAPSULE ORAL 3 TIMES DAILY
Qty: 30 CAPSULE | Refills: 0 | Status: SHIPPED | OUTPATIENT
Start: 2021-04-15 | End: 2021-06-01

## 2021-04-15 NOTE — ASSESSMENT & PLAN NOTE
Stop taking vitamin C.   PPI sent in to use as needed. Green packet given for EGD if things worse.

## 2021-04-15 NOTE — PROGRESS NOTES
Subjective   Tara Cisse is a 60 y.o. female.     Chief Complaint   Patient presents with   • Palpitations   • URI   • Heartburn       Palpitations   This is a recurrent problem. The current episode started today. The problem occurs 2 to 4 times per day. The problem has been unchanged. On average, each episode lasts 2 minutes. The symptoms are aggravated by caffeine and nicotine. Associated symptoms include coughing and malaise/fatigue. Pertinent negatives include no anxiety, chest pain, diaphoresis, fever, nausea, shortness of breath or vomiting. Treatments tried: lying down. The treatment provided moderate relief. Risk factors include stress, smoking/tobacco exposure, dyslipidemia, post menopause and obesity.   URI   This is a new problem. The current episode started in the past 7 days. The problem has been gradually worsening. There has been no fever. Associated symptoms include congestion, coughing, headaches and sinus pain. Pertinent negatives include no abdominal pain, chest pain, diarrhea, ear pain, nausea, rash, rhinorrhea or vomiting.   Heartburn  She complains of belching, coughing and heartburn. She reports no abdominal pain, no chest pain or no nausea. This is a chronic problem. The current episode started more than 1 year ago. The problem occurs frequently. The problem has been unchanged. The heartburn duration is an hour. The heartburn is located in the substernum. The heartburn is of moderate intensity. Risk factors include smoking/tobacco exposure and obesity.      She is having muscle spasms in right and under right breast. She has been moving furniture. She says she gets heartburn after taking vitamin C.     The following portions of the patient's history were reviewed and updated as appropriate: allergies, current medications, past family history, past medical history, past social history, past surgical history and problem list.    Allergies:  Allergies   Allergen Reactions   • Augmentin  [Amoxicillin-Pot Clavulanate] Nausea And Vomiting   • Sulfa Antibiotics Nausea And Vomiting       Social History:  Social History     Socioeconomic History   • Marital status:      Spouse name: Not on file   • Number of children: Not on file   • Years of education: Not on file   • Highest education level: Not on file   Tobacco Use   • Smoking status: Current Every Day Smoker     Packs/day: 1.00     Years: 39.00     Pack years: 39.00     Types: Cigarettes     Start date: 1980   • Smokeless tobacco: Never Used   Vaping Use   • Vaping Use: Former   • Start date: 4/13/2018   • Quit date: 5/15/2018   • Substances: Nicotine   Substance and Sexual Activity   • Alcohol use: No   • Drug use: Never     Comment: gabapentin for knee prn   • Sexual activity: Yes     Partners: Male       Family History:  Family History   Problem Relation Age of Onset   • Hyperlipidemia Mother    • Diabetes Father    • Heart failure Father    • Hyperlipidemia Father    • Heart failure Paternal Grandmother        Past Medical History :  Patient Active Problem List   Diagnosis   • Anemia due to vitamin B12 deficiency   • Vitamin B 12 deficiency   • Depressive disorder   • Gastroesophageal reflux disease without esophagitis   • Mixed hyperlipidemia   • Osteoarthritis of right knee   • Simple chronic bronchitis (CMS/HCC)   • Tobacco use disorder   • Annual visit for general adult medical examination with abnormal findings   • Encounter for screening for malignant neoplasm of breast   • Dense breast   • Asymptomatic menopausal state   • Colon cancer screening   • Cervical cancer screening   • Vaccine counseling   • Morbidly obese (CMS/HCC)   • Acute non-recurrent maxillary sinusitis   • Costochondral chest pain       Medication List:  Outpatient Encounter Medications as of 4/15/2021   Medication Sig Dispense Refill   • albuterol (PROVENTIL) (2.5 MG/3ML) 0.083% nebulizer solution Take 2.5 mg by nebulization Every 4 (Four) Hours As Needed for  Wheezing. 75 mL 12   • gabapentin (NEURONTIN) 100 MG capsule Take 1 capsule by mouth once daily 90 capsule 0   • loratadine (Claritin) 10 MG tablet Take 10 mg by mouth Daily.     • simvastatin (ZOCOR) 40 MG tablet Take 1 tablet by mouth Every Night. 30 tablet 12   • Ventolin  (90 Base) MCG/ACT inhaler Inhale 2 puffs Every 4 (Four) Hours As Needed for Wheezing or Shortness of Air. 6.7 g 3   • vitamin B-12 (CYANOCOBALAMIN) 500 MCG tablet Take 500 mcg by mouth Daily. Vitamin B12 deficiency     • omeprazole (priLOSEC) 40 MG capsule Take 1 capsule by mouth Daily. 30 capsule 12   • [DISCONTINUED] montelukast (SINGULAIR) 10 MG tablet Take 1 tablet by mouth Every Night. 15 tablet 0   • [DISCONTINUED] promethazine-dextromethorphan (PROMETHAZINE-DM) 6.25-15 MG/5ML syrup Take 5 mL by mouth At Night As Needed for Cough. 90 mL 0     No facility-administered encounter medications on file as of 4/15/2021.       Past Surgical History:  Past Surgical History:   Procedure Laterality Date   • ESSURE TUBAL LIGATION Bilateral    • TOTAL ABDOMINAL HYSTERECTOMY      ovaries remain, benign reasons       Review of Systems:  Review of Systems   Constitutional: Positive for malaise/fatigue. Negative for activity change, chills, diaphoresis and fever.   HENT: Positive for congestion. Negative for ear pain, rhinorrhea, sinus pressure and voice change.    Eyes: Negative for visual disturbance.   Respiratory: Positive for cough. Negative for shortness of breath.    Cardiovascular: Positive for palpitations. Negative for chest pain.   Gastrointestinal: Negative for abdominal pain, diarrhea, nausea and vomiting.   Endocrine: Negative for cold intolerance, heat intolerance, polydipsia and polyphagia.   Genitourinary: Negative for frequency and urgency.   Musculoskeletal: Negative for arthralgias and neck stiffness.   Skin: Negative for color change, pallor and rash.   Neurological: Negative for seizures and syncope.   Hematological: Negative  "for adenopathy. Does not bruise/bleed easily.   Psychiatric/Behavioral: Negative for depressed mood. The patient is not nervous/anxious.        I have reviewed and confirmed the accuracy of the HPI and ROS as documented by the MA/LPN/RN Roseanne Viera MD    Vital Signs:  Visit Vitals  /72 (BP Location: Right arm, Patient Position: Sitting, Cuff Size: Adult)   Pulse 104   Temp 97.1 °F (36.2 °C) (Temporal)   Resp 18   Ht 154.9 cm (61\")   Wt 82.2 kg (181 lb 3.2 oz)   LMP  (LMP Unknown)   SpO2 97%   Breastfeeding No   BMI 34.24 kg/m²       ECG 12 Lead    Date/Time: 4/15/2021 8:54 AM  Performed by: Roseanne Viera MD  Authorized by: Roseanne Viera MD   Comparison: not compared with previous ECG   Rhythm: sinus rhythm  Rate: normal  Conduction: conduction normal  ST Segments: ST segments normal  T Waves: T waves normal  QRS axis: normal  Other: no other findings    Clinical impression: normal ECG            Physical Exam  Vitals reviewed.   Constitutional:       Appearance: Normal appearance. She is well-developed.   HENT:      Head: Normocephalic and atraumatic.      Right Ear: External ear normal. No decreased hearing noted. No tenderness. No middle ear effusion. Tympanic membrane is not injected, erythematous, retracted or bulging.      Left Ear: External ear normal. No decreased hearing noted. No tenderness.  No middle ear effusion. Tympanic membrane is not injected, erythematous, retracted or bulging.      Nose: Nose normal. No rhinorrhea.      Mouth/Throat:      Pharynx: No oropharyngeal exudate or posterior oropharyngeal erythema.   Eyes:      General:         Right eye: No discharge.         Left eye: No discharge.   Cardiovascular:      Rate and Rhythm: Normal rate and regular rhythm.      Heart sounds: Normal heart sounds. No murmur heard.   No friction rub. No gallop.    Pulmonary:      Effort: Pulmonary effort is normal. No respiratory distress.      Breath sounds: Normal breath sounds. No " wheezing or rales.   Lymphadenopathy:      Cervical: No cervical adenopathy.   Skin:     General: Skin is warm and dry.      Findings: No rash.   Neurological:      Mental Status: She is alert and oriented to person, place, and time.      Coordination: Coordination normal.      Gait: Gait normal.   Psychiatric:         Behavior: Behavior is cooperative.         Assessment and Plan:  Problem List Items Addressed This Visit        ENT    Acute non-recurrent maxillary sinusitis    Current Assessment & Plan     increase fluids, tylenol for fever, motrin for pain. Humidifier to help with congestion and to sleep at night. Dicussed OTC meds, gargle with warm salt water. If there is recurrent fever, shortness of breath, lethargy, advised to come in to the office or go to the ER.              Endocrine and Metabolic    Morbidly obese (CMS/HCC)       Gastrointestinal Abdominal     Gastroesophageal reflux disease without esophagitis    Current Assessment & Plan     Stop taking vitamin C.   PPI sent in to use as needed. Green packet given for EGD if things worse.          Relevant Medications    omeprazole (priLOSEC) 40 MG capsule       Musculoskeletal and Injuries    Costochondral chest pain    Overview     Upper right chest  Take nsaids as need.   If worsens, she is to let me know         Current Assessment & Plan     Upper right chest  Take nsaids as need.   If worsens, she is to let me know            Tobacco    Tobacco use disorder      Other Visit Diagnoses     Palpitations    -  Primary    not really. Its muscle spasms under right breast    Relevant Orders    ECG 12 Lead (Completed)          An After Visit Summary and PPPS were given to the patient.       I wore protective equipment throughout this patient encounter to include mask. Hand hygiene was performed before donning protective equipment and after removal when leaving the room.

## 2021-06-01 ENCOUNTER — OFFICE VISIT (OUTPATIENT)
Dept: FAMILY MEDICINE CLINIC | Facility: CLINIC | Age: 61
End: 2021-06-01

## 2021-06-01 VITALS
RESPIRATION RATE: 18 BRPM | DIASTOLIC BLOOD PRESSURE: 67 MMHG | OXYGEN SATURATION: 95 % | TEMPERATURE: 98.4 F | BODY MASS INDEX: 33.23 KG/M2 | SYSTOLIC BLOOD PRESSURE: 98 MMHG | WEIGHT: 176 LBS | HEART RATE: 81 BPM | HEIGHT: 61 IN
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
## 2021-06-01 PROCEDURE — 99213 OFFICE O/P EST LOW 20 MIN: CPT | Performed by: FAMILY MEDICINE

## 2021-06-01 RX ORDER — DOXYCYCLINE 100 MG/1
100 CAPSULE ORAL 2 TIMES DAILY
Qty: 28 CAPSULE | Refills: 0 | Status: SHIPPED | OUTPATIENT
Start: 2021-06-01 | End: 2021-06-07

## 2021-06-01 NOTE — PROGRESS NOTES
Subjective   Tara Cisse is a 60 y.o. female.     Some slight erythema surrounding the bite area. No fever or joint aches, no malaise    Tick Removal  This is a new problem. The current episode started in the past 7 days. Pertinent negatives include no abdominal pain, chest pain, chills, nausea, sore throat or visual change. Nothing aggravates the symptoms. She has tried nothing for the symptoms.        The following portions of the patient's history were reviewed and updated as appropriate: allergies, current medications, past family history, past medical history, past social history, past surgical history and problem list.    Patient Active Problem List   Diagnosis   • Anemia due to vitamin B12 deficiency   • Vitamin B 12 deficiency   • Depressive disorder   • Gastroesophageal reflux disease without esophagitis   • Mixed hyperlipidemia   • Osteoarthritis of right knee   • Simple chronic bronchitis (CMS/HCC)   • Tobacco use disorder   • Annual visit for general adult medical examination with abnormal findings   • Encounter for screening for malignant neoplasm of breast   • Dense breast   • Asymptomatic menopausal state   • Colon cancer screening   • Cervical cancer screening   • Vaccine counseling   • Morbidly obese (CMS/HCC)   • Acute non-recurrent maxillary sinusitis   • Costochondral chest pain       Current Outpatient Medications on File Prior to Visit   Medication Sig Dispense Refill   • albuterol (PROVENTIL) (2.5 MG/3ML) 0.083% nebulizer solution Take 2.5 mg by nebulization Every 4 (Four) Hours As Needed for Wheezing. 75 mL 12   • gabapentin (NEURONTIN) 100 MG capsule Take 1 capsule by mouth once daily 90 capsule 0   • loratadine (Claritin) 10 MG tablet Take 10 mg by mouth Daily.     • omeprazole (priLOSEC) 40 MG capsule Take 1 capsule by mouth Daily. 30 capsule 12   • simvastatin (ZOCOR) 40 MG tablet Take 1 tablet by mouth Every Night. 30 tablet 12   • Ventolin  (90 Base) MCG/ACT inhaler Inhale 2  "puffs Every 4 (Four) Hours As Needed for Wheezing or Shortness of Air. 6.7 g 3   • vitamin B-12 (CYANOCOBALAMIN) 500 MCG tablet Take 500 mcg by mouth Daily. Vitamin B12 deficiency     • [DISCONTINUED] cephalexin (KEFLEX) 500 MG capsule Take 1 capsule by mouth 3 (Three) Times a Day. 30 capsule 0     No current facility-administered medications on file prior to visit.     Current outpatient and discharge medications have been reconciled for the patient.  Reviewed by: Mookie Casarez MD      Allergies   Allergen Reactions   • Augmentin [Amoxicillin-Pot Clavulanate] Nausea And Vomiting   • Sulfa Antibiotics Nausea And Vomiting       Review of Systems   Constitutional: Negative for chills.   HENT: Negative for sore throat.    Cardiovascular: Negative for chest pain.   Gastrointestinal: Negative for abdominal pain and nausea.     I have reviewed and confirmed the accuracy of the ROS as documented by the MA/LPN/RN Mookie Casarez MD    Objective   Visit Vitals  BP 98/67 (BP Location: Right arm, Patient Position: Sitting, Cuff Size: Adult)   Pulse 81   Temp 98.4 °F (36.9 °C)   Resp 18   Ht 154.9 cm (61\")   Wt 79.8 kg (176 lb)   LMP  (LMP Unknown)   SpO2 95%   BMI 33.25 kg/m²       Physical Exam  Constitutional:       Appearance: She is well-developed.   HENT:      Head: Normocephalic and atraumatic.      Right Ear: External ear normal.      Left Ear: External ear normal.      Nose: Nose normal.   Eyes:      Pupils: Pupils are equal, round, and reactive to light.   Cardiovascular:      Rate and Rhythm: Normal rate and regular rhythm.      Heart sounds: Normal heart sounds.   Pulmonary:      Effort: Pulmonary effort is normal.      Breath sounds: Normal breath sounds.   Abdominal:      General: Bowel sounds are normal.      Palpations: Abdomen is soft.   Musculoskeletal:      Cervical back: Normal range of motion and neck supple.   Skin:     General: Skin is warm and dry.   Neurological:      Mental Status: She " is alert and oriented to person, place, and time.   Psychiatric:         Behavior: Behavior normal.         Thought Content: Thought content normal.         Judgment: Judgment normal.         Assessment/Plan .    Diagnoses and all orders for this visit:    1. Tick bite, initial encounter (Primary)  -     doxycycline (MONODOX) 100 MG capsule; Take 1 capsule by mouth 2 (Two) Times a Day for 14 days.  Dispense: 28 capsule; Refill: 0     Empiric atbx. Follow-up in 2 weeks if not better.  Follow-up sooner for worsening symptoms or for any concerns.    Follow-up for routine health maintenance as directed       I wore protective equipment throughout this patient encounter to include mask and eye protection. Hand hygiene was performed before donning protective equipment and after removal when leaving the room

## 2021-06-07 ENCOUNTER — TELEPHONE (OUTPATIENT)
Dept: FAMILY MEDICINE CLINIC | Facility: CLINIC | Age: 61
End: 2021-06-07
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
## 2021-06-07 RX ORDER — AMOXICILLIN 500 MG/1
500 TABLET, FILM COATED ORAL 3 TIMES DAILY
Qty: 30 TABLET | Refills: 0 | Status: SHIPPED | OUTPATIENT
Start: 2021-06-07 | End: 2021-06-22

## 2021-06-07 NOTE — TELEPHONE ENCOUNTER
Caller: Tara Cisse    Relationship to patient: Self    Best call back number: [phone]    Patient is needing: PATIENT WAS PRESCRIBED doxycycline (MONODOX) 100 MG capsul FOR A TICK BITE ON 6-1-21. PATIENT TOOK MEDICATION FOR 3 DAYS. ON 4TH DAY, SHE WOKE UP WITH SWOLLEN LIPS AND A TIGHTNESS IN HER CHEST. SHE STOPPED TAKING MEDICATION AND THE SYMPTOMS WENT AWAY. PATIENT IS ASKING IF THERE IS ANOTHER ANTIBIOTIC THAT COULD BE PRESCRIBED IN PLACE OF THIS ONE.         [id number] [place] - [address] AT [facility] OF  [address]  [address] - [phone]  - [phone] FX

## 2021-06-09 DIAGNOSIS — J41.0 SIMPLE CHRONIC BRONCHITIS (HCC): ICD-10-CM

## 2021-06-10 RX ORDER — ALBUTEROL SULFATE 90 UG/1
AEROSOL, METERED RESPIRATORY (INHALATION)
Qty: 18 G | Refills: 12 | Status: SHIPPED | OUTPATIENT
Start: 2021-06-10 | End: 2022-01-10

## 2021-06-14 ENCOUNTER — OFFICE VISIT (OUTPATIENT)
Dept: FAMILY MEDICINE CLINIC | Facility: CLINIC | Age: 61
End: 2021-06-14

## 2021-06-14 VITALS
TEMPERATURE: 98.4 F | HEART RATE: 107 BPM | DIASTOLIC BLOOD PRESSURE: 80 MMHG | SYSTOLIC BLOOD PRESSURE: 118 MMHG | BODY MASS INDEX: 34.14 KG/M2 | OXYGEN SATURATION: 96 % | HEIGHT: 61 IN | WEIGHT: 180.8 LBS | RESPIRATION RATE: 18 BRPM

## 2021-06-14 DIAGNOSIS — J41.0 SIMPLE CHRONIC BRONCHITIS (HCC): Primary | ICD-10-CM

## 2021-06-14 DIAGNOSIS — S82.65XA NONDISPLACED FRACTURE OF LATERAL MALLEOLUS OF LEFT FIBULA, INITIAL ENCOUNTER FOR CLOSED FRACTURE: ICD-10-CM

## 2021-06-14 DIAGNOSIS — E78.2 MIXED HYPERLIPIDEMIA: ICD-10-CM

## 2021-06-14 DIAGNOSIS — D51.3 OTHER DIETARY VITAMIN B12 DEFICIENCY ANEMIA: ICD-10-CM

## 2021-06-14 DIAGNOSIS — M25.562 ACUTE PAIN OF LEFT KNEE: ICD-10-CM

## 2021-06-14 DIAGNOSIS — R53.83 OTHER FATIGUE: ICD-10-CM

## 2021-06-14 DIAGNOSIS — E53.8 VITAMIN B 12 DEFICIENCY: ICD-10-CM

## 2021-06-14 PROBLEM — J01.00 ACUTE NON-RECURRENT MAXILLARY SINUSITIS: Status: RESOLVED | Noted: 2021-04-15 | Resolved: 2021-06-14

## 2021-06-14 PROCEDURE — 99214 OFFICE O/P EST MOD 30 MIN: CPT | Performed by: FAMILY MEDICINE

## 2021-06-14 RX ORDER — ASPIRIN 81 MG/1
81 TABLET, CHEWABLE ORAL DAILY
COMMUNITY

## 2021-06-14 NOTE — PROGRESS NOTES
Subjective   Tara Cisse is a 60 y.o. female.     Chief Complaint   Patient presents with   • Hyperlipidemia   • Foot Pain   • Knee Pain       Pt advised that she sprained or twisted her left foot while camping 2 weeks ago. Went to [facility] ER and was told to f/u with Dr. Carbone but she did not.      Hyperlipidemia  This is a chronic problem. The current episode started more than 1 year ago. There are no known factors aggravating her hyperlipidemia. Pertinent negatives include no chest pain or shortness of breath. Current antihyperlipidemic treatment includes statins. The current treatment provides moderate improvement of lipids. There are no compliance problems.    Foot Injury   The incident occurred more than 1 week ago. [place] park. The injury mechanism was a fall. The pain is present in the left foot. The quality of the pain is described as aching. The pain is mild. The pain has been fluctuating since onset. Associated symptoms include an inability to bear weight. She reports no foreign bodies present.   Knee Pain   The incident occurred more than 1 week ago. The incident occurred at the park. The injury mechanism was a fall. The pain is present in the left knee. The pain is at a severity of 4/10. The pain is moderate. The pain has been fluctuating since onset. Associated symptoms include an inability to bear weight.        I personally reviewed and updated the patient's allergies, medications, problem list, and past medical, surgical, social, and family history. I have reviewed and confirmed the accuracy of the History of Present Illness and Review of Symptoms as documented by the MA/LPN/RN. Roseanne Viera MD    Allergies:  Allergies   Allergen Reactions   • Doxycycline Swelling   • Augmentin [Amoxicillin-Pot Clavulanate] Nausea And Vomiting   • Sulfa Antibiotics Nausea And Vomiting       Social History:  Social History     Socioeconomic History   • Marital status:       Spouse name: Not on file   • Number of children: Not on file   • Years of education: Not on file   • Highest education level: Not on file   Tobacco Use   • Smoking status: Current Every Day Smoker     Packs/day: 1.00     Years: 39.00     Pack years: 39.00     Types: Cigarettes     Start date: 1980   • Smokeless tobacco: Never Used   Vaping Use   • Vaping Use: Former   • Start date: 4/13/2018   • Quit date: 5/15/2018   • Substances: Nicotine   Substance and Sexual Activity   • Alcohol use: No   • Drug use: Never     Comment: gabapentin for knee prn   • Sexual activity: Yes     Partners: Male       Family History:  Family History   Problem Relation Age of Onset   • Hyperlipidemia Mother    • Diabetes Father    • Heart failure Father    • Hyperlipidemia Father    • Heart failure Paternal Grandmother        Past Medical History :  Patient Active Problem List   Diagnosis   • Anemia due to vitamin B12 deficiency   • Vitamin B 12 deficiency   • Depressive disorder   • Gastroesophageal reflux disease without esophagitis   • Mixed hyperlipidemia   • Osteoarthritis of right knee   • Simple chronic bronchitis (CMS/HCC)   • Tobacco use disorder   • Annual visit for general adult medical examination with abnormal findings   • Encounter for screening for malignant neoplasm of breast   • Dense breast   • Asymptomatic menopausal state   • Colon cancer screening   • Cervical cancer screening   • Vaccine counseling   • Morbidly obese (CMS/HCC)   • Costochondral chest pain   • Other fatigue   • Nondisplaced fracture of lateral malleolus of left fibula, initial encounter for closed fracture   • Acute pain of left knee       Medication List:    Current Outpatient Medications:   •  albuterol (PROVENTIL) (2.5 MG/3ML) 0.083% nebulizer solution, Take 2.5 mg by nebulization Every 4 (Four) Hours As Needed for Wheezing., Disp: 75 mL, Rfl: 12  •  amoxicillin (AMOXIL) 500 MG tablet, Take 1 tablet by mouth 3 (Three) Times a Day., Disp: 30 tablet,  "Rfl: 0  •  aspirin 81 MG chewable tablet, Chew 81 mg Daily., Disp: , Rfl:   •  gabapentin (NEURONTIN) 100 MG capsule, Take 1 capsule by mouth once daily, Disp: 90 capsule, Rfl: 0  •  loratadine (Claritin) 10 MG tablet, Take 10 mg by mouth Daily., Disp: , Rfl:   •  omeprazole (priLOSEC) 40 MG capsule, Take 1 capsule by mouth Daily., Disp: 30 capsule, Rfl: 12  •  simvastatin (ZOCOR) 40 MG tablet, Take 1 tablet by mouth Every Night., Disp: 30 tablet, Rfl: 12  •  Ventolin  (90 Base) MCG/ACT inhaler, INHALE 2 PUFFS BY MOUTH EVERY 4 HOURS AS NEEDED FOR WHEEZING OR SHORTNESS OF BREATH, Disp: 18 g, Rfl: 12  •  vitamin B-12 (CYANOCOBALAMIN) 500 MCG tablet, Take 500 mcg by mouth Daily. Vitamin B12 deficiency, Disp: , Rfl:     Past Surgical History:  Past Surgical History:   Procedure Laterality Date   • ESSURE TUBAL LIGATION Bilateral    • TOTAL ABDOMINAL HYSTERECTOMY      ovaries remain, benign reasons       Review of Systems:  Review of Systems   Constitutional: Negative for activity change and fever.   HENT: Negative for ear pain, rhinorrhea, sinus pressure and voice change.    Eyes: Negative for visual disturbance.   Respiratory: Negative for cough and shortness of breath.    Cardiovascular: Negative for chest pain.   Gastrointestinal: Negative for abdominal pain, diarrhea, nausea and vomiting.   Endocrine: Negative for cold intolerance and heat intolerance.   Genitourinary: Negative for frequency and urgency.   Musculoskeletal: Negative for arthralgias.   Skin: Negative for rash.   Neurological: Negative for syncope.   Hematological: Does not bruise/bleed easily.   Psychiatric/Behavioral: Negative for depressed mood. The patient is not nervous/anxious.        Physical Exam:  Vital Signs:  Vital Signs:   /80 (BP Location: Left arm, Patient Position: Sitting, Cuff Size: Adult)   Pulse 107   Temp 98.4 °F (36.9 °C)   Resp 18   Ht 154.9 cm (60.98\")   Wt 82 kg (180 lb 12.8 oz)   SpO2 96%   BMI 34.18 kg/m²   "   Result Review :   The following data was reviewed by: Roseanne Viera MD on 06/14/2021:    Data reviewed: Radiologic studies xray of ankle and Recent hospitalization notes ER [facility] report         Physical Exam  Vitals reviewed.   Constitutional:       Appearance: Normal appearance. She is well-developed.   HENT:      Head: Normocephalic and atraumatic.   Eyes:      General:         Right eye: No discharge.         Left eye: No discharge.   Cardiovascular:      Rate and Rhythm: Normal rate and regular rhythm.      Heart sounds: Normal heart sounds. No murmur heard.   No friction rub. No gallop.    Pulmonary:      Effort: Pulmonary effort is normal. No respiratory distress.      Breath sounds: Normal breath sounds. No wheezing or rales.   Musculoskeletal:      Left knee: No swelling or erythema. Normal range of motion. No tenderness. No LCL laxity, MCL laxity, ACL laxity or PCL laxity.     Left foot: Normal range of motion and normal capillary refill. Normal pulse.      Comments: Bruising under lateral mallelous. Mild swelling lateral mallelous. Non tender    Left knee with no pain or crepitus   Skin:     General: Skin is warm and dry.      Findings: No rash.   Neurological:      Mental Status: She is alert and oriented to person, place, and time.      Coordination: Coordination normal.      Gait: Gait normal.   Psychiatric:         Behavior: Behavior is cooperative.         Assessment and Plan:  Problems Addressed this Visit        Cardiac and Vasculature    Mixed hyperlipidemia    Relevant Orders    Comprehensive Metabolic Panel (Completed)    Lipid Panel With / Chol / HDL Ratio (Completed)       Endocrine and Metabolic    Vitamin B 12 deficiency    Relevant Orders    Vitamin B12 (Completed)       Hematology and Neoplasia    Anemia due to vitamin B12 deficiency     recheck            Musculoskeletal and Injuries    Acute pain of left knee     From walking off with a boot with her ankle pain.             Pulmonary  and Pneumonias    Simple chronic bronchitis (CMS/HCC) - Primary     Stable with current medications            Symptoms and Signs    Other fatigue     Will check labs         Relevant Orders    CBC & Differential (Completed)    TSH (Completed)       Other    Nondisplaced fracture of lateral malleolus of left fibula, initial encounter for closed fracture     Possibly from xray at [facility]  Will have her see orthopedist. Wear boot for now           Diagnoses       Codes Comments    Simple chronic bronchitis (CMS/HCC)    -  Primary ICD-10-CM: J41.0  ICD-9-CM: 491.0     Mixed hyperlipidemia     ICD-10-CM: E78.2  ICD-9-CM: 272.2     Vitamin B 12 deficiency     ICD-10-CM: E53.8  ICD-9-CM: 266.2     Other dietary vitamin B12 deficiency anemia     ICD-10-CM: D51.3  ICD-9-CM: 281.1     Other fatigue     ICD-10-CM: R53.83  ICD-9-CM: 780.79     Nondisplaced fracture of lateral malleolus of left fibula, initial encounter for closed fracture     ICD-10-CM: S82.65XA  ICD-9-CM: 824.2     Acute pain of left knee     ICD-10-CM: M25.562  ICD-9-CM: 719.46            An After Visit Summary and PPPS were given to the patient.         I wore protective equipment throughout this patient encounter to include mask. Hand hygiene was performed before donning protective equipment and after removal when leaving the room.

## 2021-06-15 LAB
ALBUMIN SERPL-MCNC: 4.6 G/DL (ref 3.8–4.9)
ALBUMIN/GLOB SERPL: 2.1 {RATIO} (ref 1.2–2.2)
ALP SERPL-CCNC: 84 IU/L (ref 48–121)
ALT SERPL-CCNC: 13 IU/L (ref 0–32)
AST SERPL-CCNC: 15 IU/L (ref 0–40)
BASOPHILS # BLD AUTO: 0.1 X10E3/UL (ref 0–0.2)
BASOPHILS NFR BLD AUTO: 1 %
BILIRUB SERPL-MCNC: 0.4 MG/DL (ref 0–1.2)
BUN SERPL-MCNC: 13 MG/DL (ref 6–24)
BUN/CREAT SERPL: 15 (ref 9–23)
CALCIUM SERPL-MCNC: 9.3 MG/DL (ref 8.7–10.2)
CHLORIDE SERPL-SCNC: 105 MMOL/L (ref 96–106)
CHOLEST SERPL-MCNC: 172 MG/DL (ref 100–199)
CHOLEST/HDLC SERPL: 3.9 RATIO (ref 0–4.4)
CO2 SERPL-SCNC: 24 MMOL/L (ref 20–29)
CREAT SERPL-MCNC: 0.89 MG/DL (ref 0.57–1)
EOSINOPHIL # BLD AUTO: 0.2 X10E3/UL (ref 0–0.4)
EOSINOPHIL NFR BLD AUTO: 2 %
ERYTHROCYTE [DISTWIDTH] IN BLOOD BY AUTOMATED COUNT: 12 % (ref 11.7–15.4)
GLOBULIN SER CALC-MCNC: 2.2 G/DL (ref 1.5–4.5)
GLUCOSE SERPL-MCNC: 95 MG/DL (ref 65–99)
HCT VFR BLD AUTO: 44.1 % (ref 34–46.6)
HDLC SERPL-MCNC: 44 MG/DL
HGB BLD-MCNC: 14.5 G/DL (ref 11.1–15.9)
IMM GRANULOCYTES # BLD AUTO: 0 X10E3/UL (ref 0–0.1)
IMM GRANULOCYTES NFR BLD AUTO: 0 %
LDLC SERPL CALC-MCNC: 102 MG/DL (ref 0–99)
LYMPHOCYTES # BLD AUTO: 2.5 X10E3/UL (ref 0.7–3.1)
LYMPHOCYTES NFR BLD AUTO: 31 %
MCH RBC QN AUTO: 29.5 PG (ref 26.6–33)
MCHC RBC AUTO-ENTMCNC: 32.9 G/DL (ref 31.5–35.7)
MCV RBC AUTO: 90 FL (ref 79–97)
MONOCYTES # BLD AUTO: 0.4 X10E3/UL (ref 0.1–0.9)
MONOCYTES NFR BLD AUTO: 5 %
NEUTROPHILS # BLD AUTO: 4.8 X10E3/UL (ref 1.4–7)
NEUTROPHILS NFR BLD AUTO: 61 %
PLATELET # BLD AUTO: 244 X10E3/UL (ref 150–450)
POTASSIUM SERPL-SCNC: 4.2 MMOL/L (ref 3.5–5.2)
PROT SERPL-MCNC: 6.8 G/DL (ref 6–8.5)
RBC # BLD AUTO: 4.91 X10E6/UL (ref 3.77–5.28)
SODIUM SERPL-SCNC: 142 MMOL/L (ref 134–144)
TRIGL SERPL-MCNC: 147 MG/DL (ref 0–149)
TSH SERPL DL<=0.005 MIU/L-ACNC: 0.49 UIU/ML (ref 0.45–4.5)
VIT B12 SERPL-MCNC: 617 PG/ML (ref 232–1245)
VLDLC SERPL CALC-MCNC: 26 MG/DL (ref 5–40)
WBC # BLD AUTO: 8 X10E3/UL (ref 3.4–10.8)

## 2021-06-17 ENCOUNTER — OFFICE VISIT (OUTPATIENT)
Dept: PODIATRY | Facility: CLINIC | Age: 61
End: 2021-06-17

## 2021-06-17 VITALS
BODY MASS INDEX: 35.34 KG/M2 | WEIGHT: 180 LBS | DIASTOLIC BLOOD PRESSURE: 76 MMHG | HEART RATE: 69 BPM | SYSTOLIC BLOOD PRESSURE: 118 MMHG | HEIGHT: 60 IN

## 2021-06-17 DIAGNOSIS — IMO0001 GRADE 2 ANKLE SPRAIN, LEFT, INITIAL ENCOUNTER: ICD-10-CM

## 2021-06-17 DIAGNOSIS — M25.572 ACUTE LEFT ANKLE PAIN: Primary | ICD-10-CM

## 2021-06-17 PROBLEM — M25.562 ACUTE PAIN OF LEFT KNEE: Status: ACTIVE | Noted: 2021-06-17

## 2021-06-17 PROCEDURE — 99203 OFFICE O/P NEW LOW 30 MIN: CPT | Performed by: PODIATRIST

## 2021-06-18 NOTE — PATIENT INSTRUCTIONS
Ankle Sprain    An ankle sprain is a stretch or tear in a ligament in the ankle. Ligaments are tissues that connect bones to each other.  The two most common types of ankle sprains are:  · Inversion sprain. This happens when the foot turns inward and the ankle rolls outward. It affects the ligament on the outside of the foot (lateral ligament).  · Eversion sprain. This happens when the foot turns outward and the ankle rolls inward. It affects the ligament on the inner side of the foot (medial ligament).  What are the causes?  This condition is often caused by accidentally rolling or twisting the ankle.  What increases the risk?  You are more likely to develop this condition if you play sports.  What are the signs or symptoms?  Symptoms of this condition include:  · Pain in your ankle.  · Swelling.  · Bruising. This may develop right after you sprain your ankle or 1-2 days later.  · Trouble standing or walking, especially when you turn or change directions.  How is this diagnosed?  This condition is diagnosed with:  · A physical exam. During the exam, your health care provider will press on certain parts of your foot and ankle and try to move them in certain ways.  · X-ray imaging. These may be taken to see how severe the sprain is and to check for broken bones.  How is this treated?  This condition may be treated with:  · A brace or splint. This is used to keep the ankle from moving until it heals.  · An elastic bandage. This is used to support the ankle.  · Crutches.  · Pain medicine.  · Surgery. This may be needed if the sprain is severe.  · Physical therapy. This may help to improve the range of motion in the ankle.  Follow these instructions at home:  If you have a brace or a splint:  · Wear the brace or splint as told by your health care provider. Remove it only as told by your health care provider.  · Loosen the brace or splint if your toes tingle, become numb, or turn cold and blue.  · Keep the brace or  splint clean.  · If the brace or splint is not waterproof:  ? Do not let it get wet.  ? Cover it with a watertight covering when you take a bath or a shower.  If you have an elastic bandage (dressing):  · Remove it to shower or bathe.  · Try not to move your ankle much, but wiggle your toes from time to time. This helps to prevent swelling.  · Adjust the dressing to make it more comfortable if it feels too tight.  · Loosen the dressing if you have numbness or tingling in your foot, or if your foot becomes cold and blue.  Managing pain, stiffness, and swelling    · Take over-the-counter and prescription medicines only as told by your health care provider.  · For 2-3 days, keep your ankle raised (elevated) above the level of your heart as much as possible.  · If directed, put ice on the injured area:  ? If you have a removable brace or splint, remove it as told by your health care provider.  ? Put ice in a plastic bag.  ? Place a towel between your skin and the bag.  ? Leave the ice on for 20 minutes, 2-3 times a day.  General instructions  · Rest your ankle.  · Do not use the injured limb to support your body weight until your health care provider says that you can. Use crutches as told by your health care provider.  · Do not use any products that contain nicotine or tobacco, such as cigarettes, e-cigarettes, and chewing tobacco. If you need help quitting, ask your health care provider.  · Keep all follow-up visits as told by your health care provider. This is important.  Contact a health care provider if:  · You have rapidly increasing bruising or swelling.  · Your pain is not relieved with medicine.  Get help right away if:  · Your foot or toes become numb or blue.  · You have severe pain that gets worse.  Summary  · An ankle sprain is a stretch or tear in a ligament in the ankle. Ligaments are tissues that connect bones to each other.  · This condition is often caused by accidentally rolling or twisting the  ankle.  · Symptoms include pain, swelling, bruising, and trouble walking.  · To relieve pain and swelling, put ice on the affected ankle, raise your ankle above the level of your heart, and use an elastic bandage.  · Keep all follow-up visits as told by your health care provider. This is important.  This information is not intended to replace advice given to you by your health care provider. Make sure you discuss any questions you have with your health care provider.  Document Revised: 09/09/2019 Document Reviewed: 05/14/2019  [email or web address] Patient Education © 2021 [employer]

## 2021-06-19 DIAGNOSIS — E78.2 MIXED HYPERLIPIDEMIA: ICD-10-CM

## 2021-06-20 RX ORDER — SIMVASTATIN 40 MG
TABLET ORAL
Qty: 90 TABLET | Refills: 3 | Status: SHIPPED | OUTPATIENT
Start: 2021-06-20 | End: 2022-04-05 | Stop reason: SDUPTHER

## 2021-06-21 NOTE — PROGRESS NOTES
"  Chief Complaint   Patient presents with   • Annual Exam   • Hyperlipidemia   • Allergies         Subjective   Tara Cisse is a 60 y.o. female here for a Well Woman Visit. Energy level is described as fair and she is sleeping fairly well. She sleeps 7 hours nightly. Last pap was hysterectomy total abdominal ovaries remain. Contraception: none. Patient exercises irregularly. Nutrition is described as in general, an \"unhealthy\" diet. Her   libido is normal. She reports that she does perform monthly self breast exam.      Hyperlipidemia  This is a chronic problem. The current episode started more than 1 year ago. The problem is controlled. She has no history of diabetes or hypothyroidism. There are no known factors aggravating her hyperlipidemia. Pertinent negatives include no chest pain, leg pain or shortness of breath. Current antihyperlipidemic treatment includes statins. The current treatment provides moderate improvement of lipids. There are no compliance problems.  Risk factors for coronary artery disease include dyslipidemia.   Allergies  This is a recurrent problem. The current episode started more than 1 year ago. The problem occurs daily. The problem has been gradually worsening. Associated symptoms include congestion and coughing. Pertinent negatives include no abdominal pain, anorexia, chest pain, chills, fatigue, fever, nausea, neck pain, numbness, rash, sore throat, swollen glands or weakness. Nothing aggravates the symptoms. Treatments tried: claritin patient does want to try to start singulair. The treatment provided moderate relief.   Insect Bite  This is a new problem. The current episode started 1 to 4 weeks ago. The problem has been unchanged. Associated symptoms include congestion and coughing. Pertinent negatives include no abdominal pain, anorexia, chest pain, chills, fatigue, fever, nausea, neck pain, numbness, rash, sore throat, swollen glands or weakness. Nothing aggravates the symptoms. " Treatments tried: antibiotics. The treatment provided mild relief.        I personally reviewed and updated the patient's allergies, medications, problem list, and past medical, surgical, social, and family history.     Allergies:  Allergies   Allergen Reactions   • Doxycycline Swelling   • Augmentin [Amoxicillin-Pot Clavulanate] Nausea And Vomiting   • Sulfa Antibiotics Nausea And Vomiting       Social History:  Social History     Socioeconomic History   • Marital status:      Spouse name: Not on file   • Number of children: Not on file   • Years of education: Not on file   • Highest education level: Not on file   Tobacco Use   • Smoking status: Current Every Day Smoker     Packs/day: 1.00     Years: 39.00     Pack years: 39.00     Types: Cigarettes     Start date: 1980   • Smokeless tobacco: Never Used   Vaping Use   • Vaping Use: Former   • Start date: 4/13/2018   • Quit date: 5/15/2018   • Substances: Nicotine   Substance and Sexual Activity   • Alcohol use: No   • Drug use: Never     Comment: gabapentin for knee prn   • Sexual activity: Yes     Partners: Male       Family History:  Family History   Problem Relation Age of Onset   • Hyperlipidemia Mother    • Diabetes Father    • Heart failure Father    • Hyperlipidemia Father    • Heart failure Paternal Grandmother        Past Medical History :  Active Ambulatory Problems     Diagnosis Date Noted   • Anemia due to vitamin B12 deficiency 01/01/1983   • Vitamin B 12 deficiency    • Depressive disorder    • Gastroesophageal reflux disease without esophagitis    • Mixed hyperlipidemia    • Osteoarthritis of right knee    • Simple chronic bronchitis (CMS/HCC)    • Tobacco use disorder    • Annual visit for general adult medical examination with abnormal findings 11/04/2019   • Encounter for screening for malignant neoplasm of breast 11/04/2019   • Dense breast 11/04/2019   • Asymptomatic menopausal state 11/04/2019   • Colon cancer screening 11/04/2019   •  Cervical cancer screening 11/04/2019   • Vaccine counseling 02/23/2021   • Morbidly obese (CMS/HCC) 02/23/2021   • Costochondral chest pain 04/15/2021   • Other fatigue 06/14/2021   • Nondisplaced fracture of lateral malleolus of left fibula, initial encounter for closed fracture 06/14/2021   • Acute pain of left knee 06/17/2021   • H/O: hysterectomy 06/22/2021   • Lung cancer screening declined by patient 06/22/2021   • Chronic nonseasonal allergic rhinitis due to pollen 06/22/2021   • Tick bite 06/22/2021     Resolved Ambulatory Problems     Diagnosis Date Noted   • Acute pain of left knee 08/03/2020   • Acute non-recurrent maxillary sinusitis 04/15/2021   • Other chronic allergic conjunctivitis 06/22/2021     No Additional Past Medical History       Medication List:    Current Outpatient Medications:   •  albuterol (PROVENTIL) (2.5 MG/3ML) 0.083% nebulizer solution, Take 2.5 mg by nebulization Every 4 (Four) Hours As Needed for Wheezing., Disp: 75 mL, Rfl: 12  •  aspirin 81 MG chewable tablet, Chew 81 mg Daily., Disp: , Rfl:   •  gabapentin (NEURONTIN) 100 MG capsule, Take 1 capsule by mouth once daily, Disp: 90 capsule, Rfl: 0  •  loratadine (Claritin) 10 MG tablet, Take 10 mg by mouth Daily., Disp: , Rfl:   •  omeprazole (priLOSEC) 40 MG capsule, Take 1 capsule by mouth Daily., Disp: 30 capsule, Rfl: 12  •  simvastatin (ZOCOR) 40 MG tablet, TAKE 1 TABLET BY MOUTH AT NIGHT, Disp: 90 tablet, Rfl: 3  •  Ventolin  (90 Base) MCG/ACT inhaler, INHALE 2 PUFFS BY MOUTH EVERY 4 HOURS AS NEEDED FOR WHEEZING OR SHORTNESS OF BREATH, Disp: 18 g, Rfl: 12  •  vitamin B-12 (CYANOCOBALAMIN) 500 MCG tablet, Take 500 mcg by mouth Daily. Vitamin B12 deficiency, Disp: , Rfl:   •  montelukast (SINGULAIR) 10 MG tablet, Take 1 tablet by mouth Every Night., Disp: 30 tablet, Rfl: 12    Past Surgical History:  Past Surgical History:   Procedure Laterality Date   • ESSURE TUBAL LIGATION Bilateral    • TOTAL ABDOMINAL HYSTERECTOMY    "   ovaries remain, benign reasons, bleeding       Depression Screen:   PHQ-2/PHQ-9 Depression Screening 4/15/2021   Little interest or pleasure in doing things 0   Feeling down, depressed, or hopeless 0   Total Score 0       Fall Risk Screen:  TACHO Fall Risk Assessment has not been completed.    Review Of Systems:  Review of Systems   Constitutional: Negative for chills, fatigue and fever.   HENT: Positive for congestion. Negative for ear pain, postnasal drip, rhinorrhea, sinus pressure, sneezing, sore throat, swollen glands and trouble swallowing.    Eyes: Negative for pain, redness and itching.   Respiratory: Positive for cough. Negative for shortness of breath and wheezing.    Cardiovascular: Negative for chest pain.   Gastrointestinal: Negative for abdominal pain, anorexia and nausea.   Musculoskeletal: Negative for neck pain.   Skin: Negative for rash.   Neurological: Negative for weakness and numbness.       Physical Exam:  Vital Signs:  Visit Vitals  /66   Pulse 98   Temp 97.5 °F (36.4 °C)   Resp 18   Ht 152.4 cm (60\")   Wt 82.3 kg (181 lb 6.4 oz)   LMP  (LMP Unknown)   SpO2 96%   BMI 35.43 kg/m²       Physical Exam  Vitals reviewed.   Constitutional:       General: She is not in acute distress.     Appearance: She is well-developed. She is not diaphoretic.   HENT:      Head: Normocephalic and atraumatic.      Right Ear: External ear normal. No decreased hearing noted. No drainage, swelling or tenderness. No middle ear effusion. There is no impacted cerumen. Tympanic membrane is not injected, erythematous, retracted or bulging.      Left Ear: External ear normal. No decreased hearing noted. No drainage, swelling or tenderness.  No middle ear effusion. There is no impacted cerumen. Tympanic membrane is not injected, erythematous, retracted or bulging.      Nose: Nose normal. No rhinorrhea.      Mouth/Throat:      Pharynx: No oropharyngeal exudate or posterior oropharyngeal erythema.   Eyes:      " General: No scleral icterus.     Conjunctiva/sclera: Conjunctivae normal.      Pupils: Pupils are equal, round, and reactive to light.   Neck:      Thyroid: No thyromegaly.      Trachea: No tracheal deviation.   Cardiovascular:      Rate and Rhythm: Normal rate and regular rhythm.      Heart sounds: Normal heart sounds. No murmur heard.   No friction rub. No gallop.    Pulmonary:      Effort: Pulmonary effort is normal. No respiratory distress.      Breath sounds: Normal breath sounds. No stridor. No wheezing or rales.   Chest:      Breasts:         Right: No inverted nipple, mass, nipple discharge, skin change or tenderness.         Left: No inverted nipple, mass, nipple discharge, skin change or tenderness.   Abdominal:      General: Bowel sounds are normal. There is no distension.      Palpations: Abdomen is soft. There is no mass.      Tenderness: There is no abdominal tenderness.      Hernia: No hernia is present.   Genitourinary:     Vagina: No vaginal discharge, erythema or tenderness.      Uterus: Absent.       Rectum: Normal. Guaiac result negative.   Musculoskeletal:         General: No tenderness or deformity. Normal range of motion.      Cervical back: Normal range of motion and neck supple.   Lymphadenopathy:      Cervical: No cervical adenopathy.   Skin:     General: Skin is warm and dry.      Findings: No rash.      Comments: insect bite in groin is healed   Neurological:      Mental Status: She is alert and oriented to person, place, and time.      Cranial Nerves: No cranial nerve deficit.      Sensory: No sensory deficit.      Motor: No abnormal muscle tone.      Coordination: Coordination normal.      Gait: Gait normal.      Deep Tendon Reflexes: Reflexes are normal and symmetric. Reflexes normal.   Psychiatric:         Behavior: Behavior normal.       WBC   Date Value Ref Range Status   06/14/2021 8.0 3.4 - 10.8 x10E3/uL Final     RBC   Date Value Ref Range Status   06/14/2021 4.91 3.77 - 5.28  x10E6/uL Final     Hemoglobin   Date Value Ref Range Status   06/14/2021 14.5 11.1 - 15.9 g/dL Final     Hematocrit   Date Value Ref Range Status   06/14/2021 44.1 34.0 - 46.6 % Final     MCV   Date Value Ref Range Status   06/14/2021 90 79 - 97 fL Final     MCH   Date Value Ref Range Status   06/14/2021 29.5 26.6 - 33.0 pg Final     MCHC   Date Value Ref Range Status   06/14/2021 32.9 31.5 - 35.7 g/dL Final     RDW   Date Value Ref Range Status   06/14/2021 12.0 11.7 - 15.4 % Final     MPV   Date Value Ref Range Status   02/15/2019 10.8 (H) 7.4 - 10.4 fL Final     Platelets   Date Value Ref Range Status   06/14/2021 244 150 - 450 x10E3/uL Final     Neutrophil Rel %   Date Value Ref Range Status   06/14/2021 61 Not Estab. % Final     Lymphocyte Rel %   Date Value Ref Range Status   06/14/2021 31 Not Estab. % Final     Monocyte Rel %   Date Value Ref Range Status   06/14/2021 5 Not Estab. % Final     Eosinophil Rel %   Date Value Ref Range Status   06/14/2021 2 Not Estab. % Final     Basophil Rel %   Date Value Ref Range Status   06/14/2021 1 Not Estab. % Final     Neutrophils Absolute   Date Value Ref Range Status   06/14/2021 4.8 1.4 - 7.0 x10E3/uL Final     Lymphocytes Absolute   Date Value Ref Range Status   06/14/2021 2.5 0.7 - 3.1 x10E3/uL Final     Monocytes Absolute   Date Value Ref Range Status   06/14/2021 0.4 0.1 - 0.9 x10E3/uL Final     Eosinophils Absolute   Date Value Ref Range Status   06/14/2021 0.2 0.0 - 0.4 x10E3/uL Final     Basophils Absolute   Date Value Ref Range Status   06/14/2021 0.1 0.0 - 0.2 x10E3/uL Final     nRBC   Date Value Ref Range Status   02/15/2019 0 0 /100[WBCs] Final     Lab Results   Component Value Date    GLUCOSE 94 02/15/2019    BUN 13 06/14/2021    CREATININE 0.89 06/14/2021    EGFRIFNONA 72 06/14/2021    EGFRIFAFRI 83 06/14/2021    BCR 15 06/14/2021    K 4.2 06/14/2021    CO2 24 06/14/2021    CALCIUM 9.3 06/14/2021    PROTENTOTREF 6.8 06/14/2021    ALBUMIN 4.6 06/14/2021     LABIL2 2.1 06/14/2021    AST 15 06/14/2021    ALT 13 06/14/2021     TSH    TSH 12/17/20 6/14/21   TSH 0.371 (A) 0.486   (A) Abnormal value            Lab Results   Component Value Date    CHOL 194 06/03/2019    CHOL 196 02/13/2019    CHOL 205 (H) 11/19/2018     Lab Results   Component Value Date    TRIG 147 06/14/2021    TRIG 173 (H) 12/17/2020    TRIG 162 (H) 11/04/2019     Lab Results   Component Value Date    HDL 44 06/14/2021    HDL 47 12/17/2020    HDL 52 11/04/2019     Lab Results   Component Value Date     (H) 06/14/2021     (H) 12/17/2020     (H) 11/04/2019     Lab Results   Component Value Date    VLDL 26 06/14/2021    VLDL 31 12/17/2020    VLDL 32 11/04/2019     No results found for: LDLHDL  Brief Urine Lab Results  (Last result in the past 365 days)      Color   Clarity   Blood   Leuk Est   Nitrite   Protein   CREAT   Urine HCG        06/22/21 0938 Yellow Cloudy Negative Negative Negative Trace                 Assessment and Plan:  Problem List Items Addressed This Visit        Advance Directives and General Issues    Lung cancer screening declined by patient       Allergies and Adverse Reactions    Chronic nonseasonal allergic rhinitis due to pollen    Current Assessment & Plan     Diagnosis, treatment and and course discussed. Potential side effects discussed. Return if there is worsening or persistence of symptoms.               Cardiac and Vasculature    Mixed hyperlipidemia       Endocrine and Metabolic    Vitamin B 12 deficiency       Gastrointestinal Abdominal     Colon cancer screening    Overview     Accepted GSI packet last year. Offered cologuard and she declines/ \Declines colonoscopy         Current Assessment & Plan     Accepted GSI packet last year. Offered cologuard and she declines/ \Declines colonoscopy            Genitourinary and Reproductive     H/O: hysterectomy    Overview     For bleeding in her 40s            Health Encounters    Annual visit for general adult  medical examination with abnormal findings    Relevant Orders    POCT urinalysis dipstick, multipro (Completed)       Musculoskeletal and Injuries    Tick bite    Current Assessment & Plan     resolved            Pulmonary and Pneumonias    Simple chronic bronchitis (CMS/HCC) - Primary    Current Assessment & Plan     Stable with current treatment. Rate use of inhalers.          Relevant Medications    montelukast (SINGULAIR) 10 MG tablet      Other Visit Diagnoses     Screening mammogram, encounter for        Relevant Orders    Mammo Screening Digital Tomosynthesis Bilateral With CAD          An After Visit Summary and PPPS were given to the patient.       Discussed injury prevention, diet and exercise, safe sexual practices, and screening for common diseases. Encouraged use of sunscreen and seatbelts. Discussed timing of  cervical cancer screening. Encouraged monthly self-breast exams, yearly clinical breast exams, and discussed timing of mammograms. Avoidance of tobacco encouraged. Limitation or avoidance of alcohol encouraged. Recommend yearly dental and eye exams. Also discussed monitoring of blood pressure, lipids.     I wore protective equipment throughout this patient encounter to include mask. Hand hygiene was performed before donning protective equipment and after removal when leaving the room.

## 2021-06-22 ENCOUNTER — OFFICE VISIT (OUTPATIENT)
Dept: FAMILY MEDICINE CLINIC | Facility: CLINIC | Age: 61
End: 2021-06-22

## 2021-06-22 VITALS
OXYGEN SATURATION: 96 % | WEIGHT: 181.4 LBS | HEIGHT: 60 IN | DIASTOLIC BLOOD PRESSURE: 66 MMHG | SYSTOLIC BLOOD PRESSURE: 122 MMHG | TEMPERATURE: 97.5 F | HEART RATE: 98 BPM | BODY MASS INDEX: 35.61 KG/M2 | RESPIRATION RATE: 18 BRPM

## 2021-06-22 DIAGNOSIS — E53.8 VITAMIN B 12 DEFICIENCY: ICD-10-CM

## 2021-06-22 DIAGNOSIS — E78.2 MIXED HYPERLIPIDEMIA: ICD-10-CM

## 2021-06-22 DIAGNOSIS — J30.89 CHRONIC NONSEASONAL ALLERGIC RHINITIS DUE TO POLLEN: ICD-10-CM

## 2021-06-22 DIAGNOSIS — Z12.31 SCREENING MAMMOGRAM, ENCOUNTER FOR: ICD-10-CM

## 2021-06-22 DIAGNOSIS — Z12.11 COLON CANCER SCREENING: ICD-10-CM

## 2021-06-22 DIAGNOSIS — Z90.710 H/O: HYSTERECTOMY: ICD-10-CM
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
## 2021-06-22 DIAGNOSIS — Z53.20 LUNG CANCER SCREENING DECLINED BY PATIENT: ICD-10-CM

## 2021-06-22 DIAGNOSIS — J41.0 SIMPLE CHRONIC BRONCHITIS (HCC): Primary | ICD-10-CM

## 2021-06-22 DIAGNOSIS — Z00.01 ANNUAL VISIT FOR GENERAL ADULT MEDICAL EXAMINATION WITH ABNORMAL FINDINGS: ICD-10-CM

## 2021-06-22 PROBLEM — H10.45 OTHER CHRONIC ALLERGIC CONJUNCTIVITIS: Status: RESOLVED | Noted: 2021-06-22 | Resolved: 2021-06-22

## 2021-06-22 PROBLEM — H10.45 OTHER CHRONIC ALLERGIC CONJUNCTIVITIS: Status: ACTIVE | Noted: 2021-06-22

## 2021-06-22 LAB
BILIRUB BLD-MCNC: NEGATIVE MG/DL
CLARITY, POC: ABNORMAL
COLOR UR: YELLOW
GLUCOSE UR STRIP-MCNC: NEGATIVE MG/DL
KETONES UR QL: NEGATIVE
LEUKOCYTE EST, POC: NEGATIVE
NITRITE UR-MCNC: NEGATIVE MG/ML
PH UR: 6.5 [PH] (ref 5–8)
PROT UR STRIP-MCNC: ABNORMAL MG/DL
RBC # UR STRIP: NEGATIVE /UL
SP GR UR: 1 (ref 1–1.03)
UROBILINOGEN UR QL: NORMAL

## 2021-06-22 PROCEDURE — 81003 URINALYSIS AUTO W/O SCOPE: CPT | Performed by: FAMILY MEDICINE

## 2021-06-22 PROCEDURE — 99396 PREV VISIT EST AGE 40-64: CPT | Performed by: FAMILY MEDICINE

## 2021-06-22 PROCEDURE — 99214 OFFICE O/P EST MOD 30 MIN: CPT | Performed by: FAMILY MEDICINE

## 2021-06-22 RX ORDER — MONTELUKAST SODIUM 10 MG/1
10 TABLET ORAL NIGHTLY
Qty: 30 TABLET | Refills: 12 | Status: SHIPPED | OUTPATIENT
Start: 2021-06-22 | End: 2021-11-17

## 2021-07-19 ENCOUNTER — HOSPITAL ENCOUNTER (OUTPATIENT)
Dept: MAMMOGRAPHY | Facility: HOSPITAL | Age: 61
Discharge: HOME | End: 2021-07-19
Admitting: FAMILY MEDICINE

## 2021-07-19 DIAGNOSIS — Z12.31 SCREENING MAMMOGRAM, ENCOUNTER FOR: ICD-10-CM

## 2021-07-19 PROCEDURE — 77063 BREAST TOMOSYNTHESIS BI: CPT

## 2021-07-19 PROCEDURE — 77067 SCR MAMMO BI INCL CAD: CPT

## 2021-11-17 ENCOUNTER — TELEPHONE (OUTPATIENT)
Dept: FAMILY MEDICINE CLINIC | Facility: CLINIC | Age: 61
End: 2021-11-17

## 2021-11-17 ENCOUNTER — OFFICE VISIT (OUTPATIENT)
Dept: FAMILY MEDICINE CLINIC | Facility: CLINIC | Age: 61
End: 2021-11-17

## 2021-11-17 VITALS
HEIGHT: 60 IN | RESPIRATION RATE: 18 BRPM | SYSTOLIC BLOOD PRESSURE: 124 MMHG | OXYGEN SATURATION: 98 % | HEART RATE: 104 BPM | BODY MASS INDEX: 34.55 KG/M2 | WEIGHT: 176 LBS | TEMPERATURE: 97.1 F | DIASTOLIC BLOOD PRESSURE: 90 MMHG

## 2021-11-17 DIAGNOSIS — F17.200 TOBACCO USE DISORDER: ICD-10-CM

## 2021-11-17 DIAGNOSIS — J06.9 ACUTE URI: Primary | ICD-10-CM

## 2021-11-17 DIAGNOSIS — E66.01 MORBIDLY OBESE (HCC): ICD-10-CM

## 2021-11-17 PROCEDURE — 99213 OFFICE O/P EST LOW 20 MIN: CPT | Performed by: FAMILY MEDICINE

## 2021-11-17 RX ORDER — PREDNISONE 1 MG/1
TABLET ORAL
Qty: 45 TABLET | Refills: 0 | Status: SHIPPED | OUTPATIENT
Start: 2021-11-17 | End: 2022-08-10

## 2021-11-17 RX ORDER — CETIRIZINE HYDROCHLORIDE 10 MG/1
10 TABLET ORAL DAILY
COMMUNITY
End: 2022-04-05

## 2021-11-17 RX ORDER — CEPHALEXIN 500 MG/1
500 CAPSULE ORAL 3 TIMES DAILY
Qty: 30 CAPSULE | Refills: 0 | Status: SHIPPED | OUTPATIENT
Start: 2021-11-17 | End: 2022-04-05

## 2021-11-17 NOTE — TELEPHONE ENCOUNTER
Caller: Tara Cisse    Relationship: Self    Best call back number: [phone]    What medication are you requesting: STEROID    If a prescription is needed, what is your preferred pharmacy and phone number: [id number] - [place], [place] - [address] AT [facility] [address]  - [phone]  - [phone] FX     Additional notes:PATIENT WAS SEEN IN OFFICE THIS MORNING, DR CASTRO STATED THAT HE WOULD SEND PRESCRIPTION FOR STEROID. PATIENT CHECKED WITH PHARMACY, THEY HAVE NOT RECEIVED THIS PRESCRIPTION  PLEASE ADVISE

## 2021-11-17 NOTE — TELEPHONE ENCOUNTER
Patient stated that Dr Ennis approved her changing PCPs from Dr Viera to Dr Ennis. Changed in chart 11/17.

## 2021-11-17 NOTE — PROGRESS NOTES
Subjective   Tara Cisse is a 60 y.o. female.     Chief Complaint   Patient presents with   • URI       URI   This is a new problem. The current episode started 1 to 4 weeks ago. The problem has been waxing and waning. There has been no fever. Associated symptoms include congestion, coughing, headaches, a plugged ear sensation and sneezing. Pertinent negatives include no abdominal pain, chest pain, diarrhea, dysuria, nausea, rhinorrhea, sinus pain or vomiting. She has tried nothing for the symptoms.            I personally reviewed and updated the patient's allergies, medications, problem list, and past medical, surgical, social, and family history. I have reviewed and confirmed the accuracy of the History of Present Illness and Review of Symptoms as documented by the MA/LPN/RN. Andrea Ennis MD    Family History   Problem Relation Age of Onset   • Hyperlipidemia Mother    • Diabetes Father    • Heart failure Father    • Hyperlipidemia Father    • Heart failure Paternal Grandmother    • Breast cancer Neg Hx    • Ovarian cancer Neg Hx        Social History     Tobacco Use   • Smoking status: Current Every Day Smoker     Packs/day: 1.00     Years: 39.00     Pack years: 39.00     Types: Cigarettes     Start date: 1980   • Smokeless tobacco: Never Used   Vaping Use   • Vaping Use: Former   • Start date: 4/13/2018   • Quit date: 5/15/2018   • Substances: Nicotine   Substance Use Topics   • Alcohol use: No   • Drug use: Never     Comment: gabapentin for knee prn       Past Surgical History:   Procedure Laterality Date   • ESSURE TUBAL LIGATION Bilateral    • TOTAL ABDOMINAL HYSTERECTOMY      ovaries remain, benign reasons, bleeding       Patient Active Problem List   Diagnosis   • Anemia due to vitamin B12 deficiency   • Vitamin B 12 deficiency   • Depressive disorder   • Gastroesophageal reflux disease without esophagitis   • Mixed hyperlipidemia   • Osteoarthritis of right knee   • Simple chronic bronchitis (HCC)    • Tobacco use disorder   • Annual visit for general adult medical examination with abnormal findings   • Encounter for screening for malignant neoplasm of breast   • Dense breast   • Asymptomatic menopausal state   • Colon cancer screening   • Cervical cancer screening   • Vaccine counseling   • Morbidly obese (HCC)   • Costochondral chest pain   • Other fatigue   • Nondisplaced fracture of lateral malleolus of left fibula, initial encounter for closed fracture   • Acute pain of left knee   • H/O: hysterectomy   • Lung cancer screening declined by patient   • Chronic nonseasonal allergic rhinitis due to pollen   • Tick bite         Current Outpatient Medications:   •  albuterol (PROVENTIL) (2.5 MG/3ML) 0.083% nebulizer solution, Take 2.5 mg by nebulization Every 4 (Four) Hours As Needed for Wheezing., Disp: 75 mL, Rfl: 12  •  aspirin 81 MG chewable tablet, Chew 81 mg Daily., Disp: , Rfl:   •  cetirizine (zyrTEC) 10 MG tablet, Take 10 mg by mouth Daily., Disp: , Rfl:   •  gabapentin (NEURONTIN) 100 MG capsule, Take 1 capsule by mouth once daily, Disp: 90 capsule, Rfl: 0  •  omeprazole (priLOSEC) 40 MG capsule, Take 1 capsule by mouth Daily., Disp: 30 capsule, Rfl: 12  •  simvastatin (ZOCOR) 40 MG tablet, TAKE 1 TABLET BY MOUTH AT NIGHT, Disp: 90 tablet, Rfl: 3  •  Ventolin  (90 Base) MCG/ACT inhaler, INHALE 2 PUFFS BY MOUTH EVERY 4 HOURS AS NEEDED FOR WHEEZING OR SHORTNESS OF BREATH, Disp: 18 g, Rfl: 12  •  vitamin B-12 (CYANOCOBALAMIN) 500 MCG tablet, Take 500 mcg by mouth Daily. Vitamin B12 deficiency, Disp: , Rfl:   •  vitamin D3 125 MCG (5000 UT) capsule capsule, Take 5,000 Units by mouth Daily., Disp: , Rfl:   •  cephalexin (KEFLEX) 500 MG capsule, Take 1 capsule by mouth 3 (Three) Times a Day., Disp: 30 capsule, Rfl: 0  •  predniSONE (DELTASONE) 5 MG tablet, 40mg x 3 days, 20mg x 3 days, 10mg x 3 days, 5mg x 3 days, Disp: 45 tablet, Rfl: 0         Review of Systems   Constitutional: Negative for chills  "and diaphoresis.   HENT: Positive for congestion and sneezing. Negative for rhinorrhea.    Eyes: Negative for visual disturbance.   Respiratory: Positive for cough. Negative for shortness of breath.    Cardiovascular: Negative for chest pain and palpitations.   Gastrointestinal: Negative for abdominal pain, diarrhea, nausea and vomiting.   Endocrine: Negative for polydipsia and polyphagia.   Genitourinary: Negative for dysuria.   Musculoskeletal: Negative for neck stiffness.   Skin: Negative for color change and pallor.   Neurological: Negative for seizures and syncope.   Hematological: Negative for adenopathy.       I have reviewed and confirmed the accuracy of the ROS as documented by the MA/LPN/RN Andrea Ennis MD      Objective   /90   Pulse 104   Temp 97.1 °F (36.2 °C)   Resp 18   Ht 152.4 cm (60\")   Wt 79.8 kg (176 lb)   LMP  (LMP Unknown)   SpO2 98%   Breastfeeding No   BMI 34.37 kg/m²   BP Readings from Last 3 Encounters:   11/17/21 124/90   06/22/21 122/66   06/17/21 118/76     Wt Readings from Last 3 Encounters:   11/17/21 79.8 kg (176 lb)   06/22/21 82.3 kg (181 lb 6.4 oz)   06/17/21 81.6 kg (180 lb)     Physical Exam  Constitutional:       Appearance: Normal appearance. She is well-developed. She is not diaphoretic.   HENT:      Head: Normocephalic.      Right Ear: Hearing, ear canal and external ear normal. A middle ear effusion is present. Tympanic membrane is erythematous.      Left Ear: Hearing, ear canal and external ear normal. A middle ear effusion is present. Tympanic membrane is erythematous.      Nose: Congestion present.      Right Sinus: Maxillary sinus tenderness and frontal sinus tenderness present.      Left Sinus: Maxillary sinus tenderness and frontal sinus tenderness present.      Mouth/Throat:      Pharynx: Posterior oropharyngeal erythema present.      Tonsils: No tonsillar abscesses. 1+ on the right. 1+ on the left.   Eyes:      General: Lids are normal.      " Conjunctiva/sclera: Conjunctivae normal.      Pupils: Pupils are equal, round, and reactive to light.   Neck:      Meningeal: Brudzinski's sign and Kernig's sign absent.   Cardiovascular:      Rate and Rhythm: Normal rate and regular rhythm.      Pulses: Normal pulses.      Heart sounds: Normal heart sounds, S1 normal and S2 normal. No murmur heard.  No friction rub. No gallop.    Pulmonary:      Effort: Pulmonary effort is normal. No accessory muscle usage or respiratory distress.      Breath sounds: No stridor. Examination of the right-upper field reveals wheezing and rhonchi. Examination of the left-upper field reveals wheezing and rhonchi. Examination of the right-middle field reveals wheezing and rhonchi. Examination of the left-middle field reveals wheezing and rhonchi. Examination of the right-lower field reveals wheezing and rhonchi. Examination of the left-lower field reveals wheezing and rhonchi. Wheezing and rhonchi present. No decreased breath sounds or rales.   Abdominal:      General: Bowel sounds are normal. There is no distension.      Palpations: Abdomen is soft. There is no mass.      Tenderness: There is no abdominal tenderness.      Hernia: No hernia is present.   Skin:     General: Skin is warm and dry.      Coloration: Skin is not pale.   Neurological:      Mental Status: She is alert and oriented to person, place, and time.      Cranial Nerves: No cranial nerve deficit.      Coordination: Coordination normal.      Gait: Gait normal.         Data / Lab Results:    No results found for: HGBA1C     Lab Results   Component Value Date     (H) 06/14/2021     (H) 12/17/2020     (H) 11/04/2019     Lab Results   Component Value Date    CHOL 194 06/03/2019    CHOL 196 02/13/2019    CHOL 205 (H) 11/19/2018     Lab Results   Component Value Date    TRIG 147 06/14/2021    TRIG 173 (H) 12/17/2020    TRIG 162 (H) 11/04/2019     Lab Results   Component Value Date    HDL 44 06/14/2021     HDL 47 12/17/2020    HDL 52 11/04/2019     No results found for: PSA  Lab Results   Component Value Date    WBC 8.0 06/14/2021    HGB 14.5 06/14/2021    HCT 44.1 06/14/2021    MCV 90 06/14/2021     06/14/2021     Lab Results   Component Value Date    TSH 0.486 06/14/2021    [id number] 1.19 08/04/2016      Lab Results   Component Value Date    GLUCOSE 95 06/14/2021    BUN 13 06/14/2021    CREATININE 0.89 06/14/2021    EGFRIFNONA 72 06/14/2021    EGFRIFAFRI 83 06/14/2021    BCR 15 06/14/2021    K 4.2 06/14/2021    CO2 24 06/14/2021    CALCIUM 9.3 06/14/2021    PROTENTOTREF 6.8 06/14/2021    ALBUMIN 4.6 06/14/2021    LABIL2 2.1 06/14/2021    AST 15 06/14/2021    ALT 13 06/14/2021     No results found for: MAKSIM, RF, SEDRATE   No results found for: CRP   No results found for: IRON, TIBC, FERRITIN   Lab Results   Component Value Date    [id number] 617 06/14/2021          Assessment/Plan      Medications        Problem List         LOS    Acute bacterial sinusitis. Start antibiotics. Increase fluid intake. Call return if fever worsening symptoms.  Allergic rhinitis. History of intolerance to multiple antihistamines and montelukast. Start nasal steroids. Call return persistent symptoms.  Tobacco use.  Encourage cessation.    Diagnoses and all orders for this visit:    1. Acute URI (Primary)  -     cephalexin (KEFLEX) 500 MG capsule; Take 1 capsule by mouth 3 (Three) Times a Day.  Dispense: 30 capsule; Refill: 0  -     predniSONE (DELTASONE) 5 MG tablet; 40mg x 3 days, 20mg x 3 days, 10mg x 3 days, 5mg x 3 days  Dispense: 45 tablet; Refill: 0    2. Tobacco use disorder    3. Morbidly obese (HCC)              Expected course, medications, and adverse effects discussed.  Call or return if worsening or persistent symptoms.  I wore protective equipment throughout this patient encounter including a mask, gloves, and eye protection.  Hand hygiene was performed before donning protective equipment and after removal when leaving  the room. The complete contents of the Assessment and Plan and Data/Lab Results as documented above have been reviewed and addressed by myself with the patient today as part of an ongoing evaluation / treatment plan.  If some of the documentation has been copied from a previous note and is unchanged it indicates that this problem / plan has been assessed today but is stable from a previous visit and no changes have been recommended.

## 2021-12-13 NOTE — PROGRESS NOTES
Subjective   Tara Cisse is a 61 y.o. female.     Chief Complaint   Patient presents with   • Leg Pain   • Hypertension   • Knee Pain       Leg Pain   The incident occurred more than 1 week ago. There was no injury mechanism. The pain is present in the left leg and left thigh. The quality of the pain is described as shooting. The pain is mild. The pain has been fluctuating since onset. Associated symptoms include numbness. Pertinent negatives include no inability to bear weight, loss of motion, muscle weakness or tingling. She reports no foreign bodies present. The symptoms are aggravated by weight bearing and movement. She has tried nothing for the symptoms. The treatment provided no relief.   Hypertension  This is a chronic problem. The current episode started more than 1 year ago. The problem is controlled. Pertinent negatives include no chest pain, headaches, malaise/fatigue, orthopnea, palpitations, shortness of breath or sweats. Risk factors for coronary artery disease include obesity, post-menopausal state, family history and dyslipidemia. Current antihypertension treatment includes nothing.   Knee Pain   The incident occurred more than 1 week ago. There was no injury mechanism. The pain is present in the left knee. The pain is at a severity of 9/10. The pain is moderate. The pain has been constant since onset. Associated symptoms include numbness. Pertinent negatives include no inability to bear weight, loss of motion, muscle weakness or tingling. The symptoms are aggravated by weight bearing and movement. She has tried NSAIDs and acetaminophen for the symptoms. The treatment provided no relief.            I personally reviewed and updated the patient's allergies, medications, problem list, and past medical, surgical, social, and family history. I have reviewed and confirmed the accuracy of the History of Present Illness and Review of Symptoms as documented by the MA/CTN/RN. Andrea Ennis MD    Family  History   Problem Relation Age of Onset   • Hyperlipidemia Mother    • Diabetes Father    • Heart failure Father    • Hyperlipidemia Father    • Heart failure Paternal Grandmother    • Breast cancer Neg Hx    • Ovarian cancer Neg Hx        Social History     Tobacco Use   • Smoking status: Current Every Day Smoker     Packs/day: 1.00     Years: 39.00     Pack years: 39.00     Types: Cigarettes     Start date: 1980   • Smokeless tobacco: Never Used   Vaping Use   • Vaping Use: Former   • Start date: 4/13/2018   • Quit date: 5/15/2018   • Substances: Nicotine   Substance Use Topics   • Alcohol use: No   • Drug use: Never     Comment: gabapentin for knee prn       Past Surgical History:   Procedure Laterality Date   • ESSURE TUBAL LIGATION Bilateral    • TOTAL ABDOMINAL HYSTERECTOMY      ovaries remain, benign reasons, bleeding       Patient Active Problem List   Diagnosis   • Anemia due to vitamin B12 deficiency   • Vitamin B 12 deficiency   • Depressive disorder   • Gastroesophageal reflux disease without esophagitis   • Mixed hyperlipidemia   • Osteoarthritis of right knee   • Simple chronic bronchitis (HCC)   • Tobacco use disorder   • Annual visit for general adult medical examination with abnormal findings   • Encounter for screening for malignant neoplasm of breast   • Dense breast   • Asymptomatic menopausal state   • Colon cancer screening   • Cervical cancer screening   • Vaccine counseling   • Morbidly obese (HCC)   • Costochondral chest pain   • Other fatigue   • Nondisplaced fracture of lateral malleolus of left fibula, initial encounter for closed fracture   • Acute pain of left knee   • H/O: hysterectomy   • Lung cancer screening declined by patient   • Chronic nonseasonal allergic rhinitis due to pollen   • Tick bite   • Leg swelling         Current Outpatient Medications:   •  albuterol (PROVENTIL) (2.5 MG/3ML) 0.083% nebulizer solution, Take 2.5 mg by nebulization Every 4 (Four) Hours As Needed for  Wheezing., Disp: 75 mL, Rfl: 12  •  aspirin 81 MG chewable tablet, Chew 81 mg Daily., Disp: , Rfl:   •  omeprazole (priLOSEC) 40 MG capsule, Take 1 capsule by mouth Daily., Disp: 30 capsule, Rfl: 12  •  simvastatin (ZOCOR) 40 MG tablet, TAKE 1 TABLET BY MOUTH AT NIGHT, Disp: 90 tablet, Rfl: 3  •  Ventolin  (90 Base) MCG/ACT inhaler, INHALE 2 PUFFS BY MOUTH EVERY 4 HOURS AS NEEDED FOR WHEEZING OR SHORTNESS OF BREATH, Disp: 18 g, Rfl: 12  •  vitamin B-12 (CYANOCOBALAMIN) 500 MCG tablet, Take 500 mcg by mouth Daily. Vitamin B12 deficiency, Disp: , Rfl:   •  vitamin D3 125 MCG (5000 UT) capsule capsule, Take 5,000 Units by mouth Daily., Disp: , Rfl:   •  cephalexin (KEFLEX) 500 MG capsule, Take 1 capsule by mouth 3 (Three) Times a Day., Disp: 30 capsule, Rfl: 0  •  cetirizine (zyrTEC) 10 MG tablet, Take 10 mg by mouth Daily., Disp: , Rfl:   •  gabapentin (NEURONTIN) 100 MG capsule, Take 1 capsule by mouth 3 (Three) Times a Day., Disp: 90 capsule, Rfl: 2  •  predniSONE (DELTASONE) 5 MG tablet, 40mg x 3 days, 20mg x 3 days, 10mg x 3 days, 5mg x 3 days, Disp: 45 tablet, Rfl: 0  No current facility-administered medications for this visit.         Review of Systems   Constitutional: Negative for chills, diaphoresis and malaise/fatigue.   Eyes: Negative for visual disturbance.   Respiratory: Negative for shortness of breath.    Cardiovascular: Negative for chest pain, palpitations and orthopnea.   Gastrointestinal: Negative for abdominal pain and nausea.   Endocrine: Negative for polydipsia and polyphagia.   Musculoskeletal: Negative for neck stiffness.   Skin: Negative for color change and pallor.   Neurological: Positive for numbness. Negative for tingling, seizures and syncope.   Hematological: Negative for adenopathy.   Psychiatric/Behavioral: Negative for hallucinations and suicidal ideas.       I have reviewed and confirmed the accuracy of the ROS as documented by the MA/LPN/RN Andrea Ennis MD      Objective  "  /80 (BP Location: Right arm, Patient Position: Sitting, Cuff Size: Adult)   Pulse 112   Temp 97.3 °F (36.3 °C) (Temporal)   Resp 18   Ht 157.5 cm (62\")   Wt 78.8 kg (173 lb 12.8 oz)   LMP  (LMP Unknown)   SpO2 98% Comment: room air  BMI 31.79 kg/m²   BP Readings from Last 3 Encounters:   12/14/21 120/80   11/17/21 124/90   06/22/21 122/66     Wt Readings from Last 3 Encounters:   12/14/21 78.8 kg (173 lb 12.8 oz)   11/17/21 79.8 kg (176 lb)   06/22/21 82.3 kg (181 lb 6.4 oz)     Physical Exam  Constitutional:       Appearance: Normal appearance. She is well-developed. She is not diaphoretic.   Cardiovascular:      Rate and Rhythm: Normal rate and regular rhythm.      Pulses: Normal pulses.      Heart sounds: Normal heart sounds, S1 normal and S2 normal. No murmur heard.  No friction rub. No gallop.    Pulmonary:      Effort: Pulmonary effort is normal. No accessory muscle usage.      Breath sounds: Normal breath sounds. No stridor. No decreased breath sounds, wheezing, rhonchi or rales.   Abdominal:      General: Bowel sounds are normal. There is no distension.      Palpations: Abdomen is soft. Abdomen is not rigid. There is no mass or pulsatile mass.      Tenderness: There is no abdominal tenderness. There is no guarding or rebound. Negative signs include Argueta's sign.      Hernia: No hernia is present.   Musculoskeletal:      Right knee: Normal. No swelling, deformity, effusion or erythema. Normal range of motion. No tenderness. No medial joint line, lateral joint line, MCL, LCL or patellar tendon tenderness. No LCL laxity or MCL laxity. Normal patellar mobility.      Left knee: Normal. No swelling, deformity, effusion or erythema. Normal range of motion. No tenderness. No medial joint line, lateral joint line, MCL, LCL or patellar tendon tenderness. No LCL laxity or MCL laxity.Normal patellar mobility.      Comments: Anterior drawer and Lachman neg, Adi neg   Skin:     General: Skin is warm " and dry.      Coloration: Skin is not pale.   Neurological:      Mental Status: She is alert and oriented to person, place, and time.      Coordination: Coordination normal.      Gait: Gait normal.         Data / Lab Results:    No results found for: HGBA1C     Lab Results   Component Value Date     (H) 06/14/2021     (H) 12/17/2020     (H) 11/04/2019     Lab Results   Component Value Date    CHOL 194 06/03/2019    CHOL 196 02/13/2019    CHOL 205 (H) 11/19/2018     Lab Results   Component Value Date    TRIG 147 06/14/2021    TRIG 173 (H) 12/17/2020    TRIG 162 (H) 11/04/2019     Lab Results   Component Value Date    HDL 44 06/14/2021    HDL 47 12/17/2020    HDL 52 11/04/2019     No results found for: PSA  Lab Results   Component Value Date    WBC 8.0 06/14/2021    HGB 14.5 06/14/2021    HCT 44.1 06/14/2021    MCV 90 06/14/2021     06/14/2021     Lab Results   Component Value Date    TSH 0.486 06/14/2021    [id number] 1.19 08/04/2016      Lab Results   Component Value Date    GLUCOSE 95 06/14/2021    BUN 13 06/14/2021    CREATININE 0.89 06/14/2021    EGFRIFNONA 72 06/14/2021    EGFRIFAFRI 83 06/14/2021    BCR 15 06/14/2021    K 4.2 06/14/2021    CO2 24 06/14/2021    CALCIUM 9.3 06/14/2021    PROTENTOTREF 6.8 06/14/2021    ALBUMIN 4.6 06/14/2021    LABIL2 2.1 06/14/2021    AST 15 06/14/2021    ALT 13 06/14/2021     No results found for: MAKSIM, RF, SEDRATE   No results found for: CRP   No results found for: IRON, TIBC, FERRITIN   Lab Results   Component Value Date    [id number] 617 06/14/2021          Assessment/Plan   Joint injection  Injection site: LEFT KNEE  Date/Time: 12/14/2021 11:57 EST  Performed by: Andrea Ennis MD  Authorized by: Andrea Ennis MD  Preparation: Patient was prepped and draped in the usual sterile fashion.  Local anesthesia used: no   Anesthesia:  Local anesthesia used: no   Sedation:  Patient sedated: no    Medications Used:  0.5cc  Marcaine: [id number] Lot-  [id number] EXP-7/1/2022  0.5cc Lidocaine 10mg/mL: [id number] Lot- [id number] EXP-3/1/22  1cc DepoMedrol 40mg: [id number] Lot- [id number] EXP-11/2022    Tara Cisse tolerated procedure well.         Medications        Problem List         LOS    Knee pain.  Chronic/recurrent, likely secondary to OA.  X-ray benign 2019.  Has had orthopedics eval, good response to steroid injection, rated repeated today.  Doing well on gabapentin, Rx refilled.  Ice, rehabilitation exercises consider orthopedics follow-up if persistent symptoms.  Claudication.  DDx includes PVD, check ABIs.  Stop smoking.  Acute bacterial sinusitis. Start antibiotics. Increase fluid intake. Call return if fever worsening symptoms.  Allergic rhinitis. History of intolerance to multiple antihistamines and montelukast. Start nasal steroids. Call return persistent symptoms.  Tobacco use.  Encourage cessation.           Diagnoses and all orders for this visit:    1. Left leg pain (Primary)    2. Hypertension, unspecified type    3. Morbidly obese (HCC)    4. Tobacco use disorder    5. Acute pain of left knee  -     methylPREDNISolone acetate (DEPO-medrol) injection 40 mg  -     gabapentin (NEURONTIN) 100 MG capsule; Take 1 capsule by mouth 3 (Three) Times a Day.  Dispense: 90 capsule; Refill: 2    6. Osteoarthritis of right knee, unspecified osteoarthritis type  -     gabapentin (NEURONTIN) 100 MG capsule; Take 1 capsule by mouth 3 (Three) Times a Day.  Dispense: 90 capsule; Refill: 2    7. Leg swelling  -     US Ankle / Brachial Indices Extremity Complete; Future              Expected course, medications, and adverse effects discussed.  Call or return if worsening or persistent symptoms.  I wore protective equipment throughout this patient encounter including a mask, gloves, and eye protection.  Hand hygiene was performed before donning protective equipment and after removal when leaving the room. The complete contents of the Assessment and Plan and  Data/Lab Results as documented above have been reviewed and addressed by myself with the patient today as part of an ongoing evaluation / treatment plan.  If some of the documentation has been copied from a previous note and is unchanged it indicates that this problem / plan has been assessed today but is stable from a previous visit and no changes have been recommended.

## 2021-12-14 ENCOUNTER — OFFICE VISIT (OUTPATIENT)
Dept: FAMILY MEDICINE CLINIC | Facility: CLINIC | Age: 61
End: 2021-12-14

## 2021-12-14 VITALS
HEART RATE: 112 BPM | DIASTOLIC BLOOD PRESSURE: 80 MMHG | BODY MASS INDEX: 31.98 KG/M2 | OXYGEN SATURATION: 98 % | RESPIRATION RATE: 18 BRPM | WEIGHT: 173.8 LBS | SYSTOLIC BLOOD PRESSURE: 120 MMHG | HEIGHT: 62 IN | TEMPERATURE: 97.3 F

## 2021-12-14 DIAGNOSIS — F17.200 TOBACCO USE DISORDER: ICD-10-CM

## 2021-12-14 DIAGNOSIS — E66.01 MORBIDLY OBESE (HCC): ICD-10-CM

## 2021-12-14 DIAGNOSIS — M79.89 LEG SWELLING: ICD-10-CM

## 2021-12-14 DIAGNOSIS — M79.605 LEFT LEG PAIN: Primary | ICD-10-CM

## 2021-12-14 DIAGNOSIS — M17.11 OSTEOARTHRITIS OF RIGHT KNEE, UNSPECIFIED OSTEOARTHRITIS TYPE: ICD-10-CM

## 2021-12-14 DIAGNOSIS — M25.562 ACUTE PAIN OF LEFT KNEE: ICD-10-CM

## 2021-12-14 DIAGNOSIS — I10 HYPERTENSION, UNSPECIFIED TYPE: ICD-10-CM

## 2021-12-14 PROCEDURE — 99214 OFFICE O/P EST MOD 30 MIN: CPT | Performed by: FAMILY MEDICINE

## 2021-12-14 PROCEDURE — 20610 DRAIN/INJ JOINT/BURSA W/O US: CPT | Performed by: FAMILY MEDICINE

## 2021-12-14 RX ORDER — GABAPENTIN 100 MG/1
100 CAPSULE ORAL 3 TIMES DAILY
Qty: 90 CAPSULE | Refills: 2 | Status: SHIPPED | OUTPATIENT
Start: 2021-12-14

## 2021-12-14 RX ORDER — METHYLPREDNISOLONE ACETATE 80 MG/ML
40 INJECTION, SUSPENSION INTRA-ARTICULAR; INTRALESIONAL; INTRAMUSCULAR; SOFT TISSUE ONCE
Status: COMPLETED | OUTPATIENT
Start: 2021-12-14 | End: 2021-12-14

## 2021-12-14 RX ADMIN — METHYLPREDNISOLONE ACETATE 40 MG: 80 INJECTION, SUSPENSION INTRA-ARTICULAR; INTRALESIONAL; INTRAMUSCULAR; SOFT TISSUE at 10:34

## 2021-12-21 ENCOUNTER — HOSPITAL ENCOUNTER (OUTPATIENT)
Dept: ULTRASOUND IMAGING | Facility: HOSPITAL | Age: 61
Discharge: HOME | End: 2021-12-21
Admitting: FAMILY MEDICINE

## 2021-12-21 DIAGNOSIS — M79.89 LEG SWELLING: ICD-10-CM

## 2021-12-21 PROCEDURE — 93923 UPR/LXTR ART STDY 3+ LVLS: CPT

## 2022-01-07 ENCOUNTER — TELEPHONE (OUTPATIENT)
Dept: FAMILY MEDICINE CLINIC | Facility: CLINIC | Age: 62
End: 2022-01-07

## 2022-01-07 NOTE — TELEPHONE ENCOUNTER
Spoke with Tara Cisse regarding recent labs results. She expressed understanding. Advised to call office with any additional questions or concerns.

## 2022-01-07 NOTE — TELEPHONE ENCOUNTER
----- Message from Renata Mayes MA sent at 1/5/2022  5:32 PM EST -----  Spoke to  on the phone and he advised me to let patient know that VINNIE looks good. No sign of blockages.

## 2022-01-09 DIAGNOSIS — J41.0 SIMPLE CHRONIC BRONCHITIS: ICD-10-CM

## 2022-01-10 RX ORDER — ALBUTEROL SULFATE 90 UG/1
AEROSOL, METERED RESPIRATORY (INHALATION)
Qty: 18 G | Refills: 12 | Status: SHIPPED | OUTPATIENT
Start: 2022-01-10 | End: 2022-08-10

## 2022-01-10 NOTE — TELEPHONE ENCOUNTER
Patient used to Dr. Viera now is established with Dr. Ennis. Please advise if refill is necessary.

## 2022-03-22 ENCOUNTER — TELEPHONE (OUTPATIENT)
Dept: FAMILY MEDICINE CLINIC | Facility: CLINIC | Age: 62
End: 2022-03-22

## 2022-04-04 NOTE — PROGRESS NOTES
Subjective   Tara Cisse is a 61 y.o. female.     Chief Complaint   Patient presents with   • Leg Pain   • Hypertension   • Knee Pain       Leg Pain   The incident occurred more than 1 week ago. There was no injury mechanism. The pain is present in the left leg and left thigh. The quality of the pain is described as shooting. The pain is mild. The pain has been improving since onset. Associated symptoms include numbness. Pertinent negatives include no inability to bear weight, loss of motion, muscle weakness or tingling. She reports no foreign bodies present. The symptoms are aggravated by weight bearing and movement. She has tried nothing for the symptoms. The treatment provided no relief.   Hypertension  This is a chronic problem. The current episode started more than 1 year ago. The problem is controlled. Pertinent negatives include no chest pain, headaches, malaise/fatigue, orthopnea, palpitations, shortness of breath or sweats. Risk factors for coronary artery disease include obesity, post-menopausal state, family history and dyslipidemia. Current antihypertension treatment includes nothing.   Knee Pain   The incident occurred more than 1 week ago. There was no injury mechanism. The pain is present in the left knee. The pain is at a severity of 9/10. The pain is moderate. The pain has been improving since onset. Associated symptoms include numbness. Pertinent negatives include no inability to bear weight, loss of motion, muscle weakness or tingling. The symptoms are aggravated by weight bearing and movement. She has tried NSAIDs and acetaminophen for the symptoms. The treatment provided no relief.            I personally reviewed and updated the patient's allergies, medications, problem list, and past medical, surgical, social, and family history. I have reviewed and confirmed the accuracy of the History of Present Illness and Review of Symptoms as documented by the MA/CTN/RN. Andrea Ennis MD    Family  History   Problem Relation Age of Onset   • Hyperlipidemia Mother    • Diabetes Father    • Heart failure Father    • Hyperlipidemia Father    • Heart failure Paternal Grandmother    • Breast cancer Neg Hx    • Ovarian cancer Neg Hx        Social History     Tobacco Use   • Smoking status: Current Every Day Smoker     Packs/day: 1.00     Years: 39.00     Pack years: 39.00     Types: Cigarettes     Start date: 1980   • Smokeless tobacco: Never Used   Vaping Use   • Vaping Use: Former   • Start date: 4/13/2018   • Quit date: 5/15/2018   • Substances: Nicotine   Substance Use Topics   • Alcohol use: No   • Drug use: Never     Comment: gabapentin for knee prn       Past Surgical History:   Procedure Laterality Date   • ESSURE TUBAL LIGATION Bilateral    • TOTAL ABDOMINAL HYSTERECTOMY      ovaries remain, benign reasons, bleeding       Patient Active Problem List   Diagnosis   • Anemia due to vitamin B12 deficiency   • Vitamin B 12 deficiency   • Depressive disorder   • Gastroesophageal reflux disease without esophagitis   • Mixed hyperlipidemia   • Osteoarthritis of right knee   • Simple chronic bronchitis (HCC)   • Tobacco use disorder   • Annual visit for general adult medical examination with abnormal findings   • Encounter for screening for malignant neoplasm of breast   • Dense breast   • Asymptomatic menopausal state   • Colon cancer screening   • Cervical cancer screening   • Vaccine counseling   • Morbidly obese (HCC)   • Costochondral chest pain   • Other fatigue   • Nondisplaced fracture of lateral malleolus of left fibula, initial encounter for closed fracture   • Acute pain of left knee   • H/O: hysterectomy   • Lung cancer screening declined by patient   • Chronic nonseasonal allergic rhinitis due to pollen   • Tick bite   • Leg swelling         Current Outpatient Medications:   •  albuterol (PROVENTIL) (2.5 MG/3ML) 0.083% nebulizer solution, Take 2.5 mg by nebulization Every 4 (Four) Hours As Needed for  "Wheezing., Disp: 75 mL, Rfl: 12  •  aspirin 81 MG chewable tablet, Chew 81 mg Daily., Disp: , Rfl:   •  gabapentin (NEURONTIN) 100 MG capsule, Take 1 capsule by mouth 3 (Three) Times a Day., Disp: 90 capsule, Rfl: 2  •  simvastatin (ZOCOR) 40 MG tablet, Take 1 tablet by mouth every night at bedtime., Disp: 90 tablet, Rfl: 3  •  Ventolin  (90 Base) MCG/ACT inhaler, INHALE 2 PUFFS BY MOUTH EVERY 4 HOURS AS NEEDED FOR WHEEZING OR SHORTNESS OF BREATH, Disp: 18 g, Rfl: 12  •  vitamin B-12 (CYANOCOBALAMIN) 500 MCG tablet, Take 500 mcg by mouth Daily. Vitamin B12 deficiency, Disp: , Rfl:   •  vitamin D3 125 MCG (5000 UT) capsule capsule, Take 5,000 Units by mouth Daily., Disp: , Rfl:   •  cephalexin (KEFLEX) 500 MG capsule, Take 1 capsule by mouth 3 (Three) Times a Day., Disp: 30 capsule, Rfl: 0  •  omeprazole (priLOSEC) 40 MG capsule, Take 1 capsule by mouth Daily., Disp: 30 capsule, Rfl: 12  •  predniSONE (DELTASONE) 5 MG tablet, 40mg x 3 days, 20mg x 3 days, 10mg x 3 days, 5mg x 3 days, Disp: 45 tablet, Rfl: 0         Review of Systems   Constitutional: Negative for chills, diaphoresis and malaise/fatigue.   Eyes: Negative for visual disturbance.   Respiratory: Negative for shortness of breath.    Cardiovascular: Negative for chest pain, palpitations and orthopnea.   Gastrointestinal: Negative for abdominal pain and nausea.   Endocrine: Negative for polydipsia and polyphagia.   Musculoskeletal: Negative for neck stiffness.   Skin: Negative for color change and pallor.   Neurological: Positive for numbness. Negative for tingling, seizures and syncope.   Hematological: Negative for adenopathy.   Psychiatric/Behavioral: Negative for hallucinations and suicidal ideas.       I have reviewed and confirmed the accuracy of the ROS as documented by the MA/LPN/RN Andrea Ennis MD      Objective   /80   Pulse 112   Temp 97.3 °F (36.3 °C)   Resp 18   Ht 157.5 cm (62\")   Wt 77.6 kg (171 lb)   LMP  (LMP Unknown)  "  SpO2 99%   Breastfeeding No   BMI 31.28 kg/m²   BP Readings from Last 3 Encounters:   04/05/22 150/80   12/14/21 120/80   11/17/21 124/90     Wt Readings from Last 3 Encounters:   04/05/22 77.6 kg (171 lb)   12/14/21 78.8 kg (173 lb 12.8 oz)   11/17/21 79.8 kg (176 lb)     Physical Exam  Constitutional:       Appearance: Normal appearance. She is well-developed. She is not diaphoretic.   Cardiovascular:      Rate and Rhythm: Normal rate and regular rhythm.      Pulses: Normal pulses.      Heart sounds: Normal heart sounds, S1 normal and S2 normal. No murmur heard.    No friction rub. No gallop.   Pulmonary:      Effort: Pulmonary effort is normal. No accessory muscle usage.      Breath sounds: Normal breath sounds. No stridor. No decreased breath sounds, wheezing, rhonchi or rales.   Abdominal:      General: Bowel sounds are normal. There is no distension.      Palpations: Abdomen is soft. Abdomen is not rigid. There is no mass or pulsatile mass.      Tenderness: There is no abdominal tenderness. There is no guarding or rebound. Negative signs include Argueta's sign.      Hernia: No hernia is present.   Musculoskeletal:      Right knee: Normal. No swelling, deformity, effusion or erythema. Normal range of motion. No tenderness. No medial joint line, lateral joint line, MCL, LCL or patellar tendon tenderness. No LCL laxity or MCL laxity. Normal patellar mobility.      Left knee: Normal. No swelling, deformity, effusion or erythema. Normal range of motion. No tenderness. No medial joint line, lateral joint line, MCL, LCL or patellar tendon tenderness. No LCL laxity or MCL laxity.Normal patellar mobility.      Comments: Anterior drawer and Lachman neg, Adi neg   Skin:     General: Skin is warm and dry.      Coloration: Skin is not pale.   Neurological:      Mental Status: She is alert and oriented to person, place, and time.      Coordination: Coordination normal.      Gait: Gait normal.         Data / Lab  Results:    No results found for: HGBA1C     Lab Results   Component Value Date     (H) 06/14/2021     (H) 12/17/2020     (H) 11/04/2019     Lab Results   Component Value Date    CHOL 194 06/03/2019    CHOL 196 02/13/2019    CHOL 205 (H) 11/19/2018     Lab Results   Component Value Date    TRIG 147 06/14/2021    TRIG 173 (H) 12/17/2020    TRIG 162 (H) 11/04/2019     Lab Results   Component Value Date    HDL 44 06/14/2021    HDL 47 12/17/2020    HDL 52 11/04/2019     No results found for: PSA  Lab Results   Component Value Date    WBC 8.0 06/14/2021    HGB 14.5 06/14/2021    HCT 44.1 06/14/2021    MCV 90 06/14/2021     06/14/2021     Lab Results   Component Value Date    TSH 0.486 06/14/2021    G8FNEXH 1.19 08/04/2016      Lab Results   Component Value Date    GLUCOSE 95 06/14/2021    BUN 13 06/14/2021    CREATININE 0.89 06/14/2021    EGFRIFNONA 72 06/14/2021    EGFRIFAFRI 83 06/14/2021    BCR 15 06/14/2021    K 4.2 06/14/2021    CO2 24 06/14/2021    CALCIUM 9.3 06/14/2021    PROTENTOTREF 6.8 06/14/2021    ALBUMIN 4.6 06/14/2021    LABIL2 2.1 06/14/2021    AST 15 06/14/2021    ALT 13 06/14/2021     No results found for: MAKSIM, RF, SEDRATE   No results found for: CRP   No results found for: IRON, TIBC, FERRITIN   Lab Results   Component Value Date    [id number] 617 06/14/2021          Assessment & Plan      Medications        Problem List         LOS    Acute bacterial bronchitis.  Start antibiotics.  Knee pain.    Much improved today status post knee injection.  Chronic/recurrent, likely secondary to OA.  X-ray benign 2019.  Has had orthopedics eval, good response to steroid injection, repeated today.  Doing well on gabapentin, taking Rx infrequently..  Ice, rehabilitation exercises consider orthopedics follow-up if persistent symptoms.  Claudication.  DDx includes PVD, check ABIs.  Stop smoking.  Acute bacterial sinusitis. Start antibiotics. Increase fluid intake. Call return if fever  worsening symptoms.  Allergic rhinitis.  History of elevated blood pressure with Claritin-D, okay to reattempt plain Claritin.  Start nasal steroids. Call return persistent symptoms.  Tobacco use.  Encourage cessation.  Hyperlipidemia.  Tolerating increased dose simvastatin.  Follow-up recheck fasting labs.  Follow-up visit for comprehensive physical exam screening test scheduled.        Diagnoses and all orders for this visit:    1. Leg swelling (Primary)    2. Tobacco use disorder    3. Morbidly obese (HCC)  Assessment & Plan:  Patient's (Body mass index is 31.28 kg/m².) indicates that they are obese (BMI >30) with health conditions that include dyslipidemias and osteoarthritis . Weight is unchanged. BMI is is above average; BMI management plan is completed. We discussed portion control and increasing exercise.       4. Mixed hyperlipidemia  -     simvastatin (ZOCOR) 40 MG tablet; Take 1 tablet by mouth every night at bedtime.  Dispense: 90 tablet; Refill: 3    5. Bronchitis  -     cephalexin (KEFLEX) 500 MG capsule; Take 1 capsule by mouth 3 (Three) Times a Day.  Dispense: 30 capsule; Refill: 0    Other orders  -     albuterol (PROVENTIL) (2.5 MG/3ML) 0.083% nebulizer solution; Take 2.5 mg by nebulization Every 4 (Four) Hours As Needed for Wheezing.  Dispense: 75 mL; Refill: 12            Expected course, medications, and adverse effects discussed.  Call or return if worsening or persistent symptoms.  I wore protective equipment throughout this patient encounter including a mask, gloves, and eye protection.  Hand hygiene was performed before donning protective equipment and after removal when leaving the room. The complete contents of the Assessment and Plan and Data/Lab Results as documented above have been reviewed and addressed by myself with the patient today as part of an ongoing evaluation / treatment plan.  If some of the documentation has been copied from a previous note and is unchanged it indicates that  this problem / plan has been assessed today but is stable from a previous visit and no changes have been recommended.

## 2022-04-05 ENCOUNTER — OFFICE VISIT (OUTPATIENT)
Dept: FAMILY MEDICINE CLINIC | Facility: CLINIC | Age: 62
End: 2022-04-05

## 2022-04-05 VITALS
OXYGEN SATURATION: 99 % | HEIGHT: 62 IN | HEART RATE: 112 BPM | DIASTOLIC BLOOD PRESSURE: 80 MMHG | SYSTOLIC BLOOD PRESSURE: 150 MMHG | TEMPERATURE: 97.3 F | RESPIRATION RATE: 18 BRPM | WEIGHT: 171 LBS | BODY MASS INDEX: 31.47 KG/M2

## 2022-04-05 DIAGNOSIS — J40 BRONCHITIS: ICD-10-CM

## 2022-04-05 DIAGNOSIS — E66.01 MORBIDLY OBESE: ICD-10-CM

## 2022-04-05 DIAGNOSIS — M79.89 LEG SWELLING: Primary | ICD-10-CM

## 2022-04-05 DIAGNOSIS — F17.200 TOBACCO USE DISORDER: ICD-10-CM

## 2022-04-05 DIAGNOSIS — E78.2 MIXED HYPERLIPIDEMIA: ICD-10-CM

## 2022-04-05 PROCEDURE — 99213 OFFICE O/P EST LOW 20 MIN: CPT | Performed by: FAMILY MEDICINE

## 2022-04-05 RX ORDER — CEPHALEXIN 500 MG/1
500 CAPSULE ORAL 3 TIMES DAILY
Qty: 30 CAPSULE | Refills: 0 | Status: SHIPPED | OUTPATIENT
Start: 2022-04-05 | End: 2022-08-10

## 2022-04-05 RX ORDER — SIMVASTATIN 40 MG
40 TABLET ORAL
Qty: 90 TABLET | Refills: 3 | Status: SHIPPED | OUTPATIENT
Start: 2022-04-05 | End: 2022-06-28 | Stop reason: SDUPTHER

## 2022-04-05 RX ORDER — ALBUTEROL SULFATE 2.5 MG/3ML
2.5 SOLUTION RESPIRATORY (INHALATION) EVERY 4 HOURS PRN
Qty: 75 ML | Refills: 12 | Status: SHIPPED | OUTPATIENT
Start: 2022-04-05 | End: 2022-08-10

## 2022-04-19 DIAGNOSIS — K21.9 GASTROESOPHAGEAL REFLUX DISEASE WITHOUT ESOPHAGITIS: ICD-10-CM

## 2022-04-19 RX ORDER — OMEPRAZOLE 40 MG/1
40 CAPSULE, DELAYED RELEASE ORAL DAILY
Qty: 30 CAPSULE | Refills: 12 | OUTPATIENT
Start: 2022-04-19

## 2022-04-19 NOTE — TELEPHONE ENCOUNTER
Caller: Tara Cisse    Relationship: Self    Best call back number:[phone]    Requested Prescriptions:   Requested Prescriptions     Pending Prescriptions Disp Refills   • omeprazole (priLOSEC) 40 MG capsule 30 capsule 12     Sig: Take 1 capsule by mouth Daily.        Pharmacy where request should be sent: [id number] - [place]  [address] [facility] [address] AT [facility] OF  & [facility] - [phone]  - [phone] FX     Additional details provided by patient: PATIENT STATES PHARMACY SENT THIS REQUEST TO DR SARABIA WHO DENIED IT. WOULD LIKE FOR DR CASTRO TO SUBMIT THIS FOR HER.    Does the patient have less than a 3 day supply:  [] Yes  [x] No    Di Bruce Rep   04/19/22 10:02 EDT

## 2022-04-20 RX ORDER — OMEPRAZOLE 40 MG/1
40 CAPSULE, DELAYED RELEASE ORAL DAILY
Qty: 30 CAPSULE | Refills: 12 | Status: SHIPPED | OUTPATIENT
Start: 2022-04-20

## 2022-06-27 PROBLEM — E66.811 CLASS 1 OBESITY DUE TO EXCESS CALORIES WITH SERIOUS COMORBIDITY AND BODY MASS INDEX (BMI) OF 31.0 TO 31.9 IN ADULT: Status: ACTIVE | Noted: 2021-02-23

## 2022-06-27 PROBLEM — E66.09 CLASS 1 OBESITY DUE TO EXCESS CALORIES WITH SERIOUS COMORBIDITY AND BODY MASS INDEX (BMI) OF 31.0 TO 31.9 IN ADULT: Status: ACTIVE | Noted: 2021-02-23

## 2022-06-28 DIAGNOSIS — E78.2 MIXED HYPERLIPIDEMIA: ICD-10-CM

## 2022-06-28 RX ORDER — SIMVASTATIN 40 MG
40 TABLET ORAL
Qty: 90 TABLET | Refills: 3 | Status: SHIPPED | OUTPATIENT
Start: 2022-06-28

## 2022-06-28 NOTE — TELEPHONE ENCOUNTER
Caller: Tara Cisse    Relationship: Self    Best call back number: [phone]       Requested Prescriptions:   Requested Prescriptions     Pending Prescriptions Disp Refills   • simvastatin (ZOCOR) 40 MG tablet 90 tablet 3     Sig: Take 1 tablet by mouth every night at bedtime.        Pharmacy where request should be sent: [facility] #[id number] - [place] AT [facility] OF  & [address] [facility] - [phone]  - [phone] FX     Additional details provided by patient: PATIENT IS NEEDING A REFILL BEFORE HER PHYSICAL IN AUGUST. PATIENT IS NOT SURE ON HOW MANY SHE HAS LEFT      Does the patient have less than a 3 day supply:  [x] Yes  [] No    Di Esteban Rep   06/28/22 08:16 EDT

## 2022-08-10 ENCOUNTER — OFFICE VISIT (OUTPATIENT)
Dept: FAMILY MEDICINE CLINIC | Facility: CLINIC | Age: 62
End: 2022-08-10

## 2022-08-10 VITALS
RESPIRATION RATE: 18 BRPM | HEIGHT: 62 IN | DIASTOLIC BLOOD PRESSURE: 73 MMHG | WEIGHT: 174.8 LBS | OXYGEN SATURATION: 98 % | BODY MASS INDEX: 32.17 KG/M2 | TEMPERATURE: 97.8 F | SYSTOLIC BLOOD PRESSURE: 134 MMHG | HEART RATE: 119 BPM

## 2022-08-10 DIAGNOSIS — Z12.31 ENCOUNTER FOR SCREENING MAMMOGRAM FOR MALIGNANT NEOPLASM OF BREAST: ICD-10-CM

## 2022-08-10 DIAGNOSIS — E66.09 CLASS 1 OBESITY DUE TO EXCESS CALORIES WITH SERIOUS COMORBIDITY AND BODY MASS INDEX (BMI) OF 31.0 TO 31.9 IN ADULT: ICD-10-CM

## 2022-08-10 DIAGNOSIS — E78.2 MIXED HYPERLIPIDEMIA: ICD-10-CM

## 2022-08-10 DIAGNOSIS — Z12.4 CERVICAL CANCER SCREENING: ICD-10-CM

## 2022-08-10 DIAGNOSIS — I10 HYPERTENSION, UNSPECIFIED TYPE: ICD-10-CM

## 2022-08-10 DIAGNOSIS — Z12.11 COLON CANCER SCREENING: ICD-10-CM

## 2022-08-10 DIAGNOSIS — N95.1 MENOPAUSAL SYNDROME: ICD-10-CM

## 2022-08-10 DIAGNOSIS — Z00.01 ANNUAL VISIT FOR GENERAL ADULT MEDICAL EXAMINATION WITH ABNORMAL FINDINGS: Primary | ICD-10-CM

## 2022-08-10 DIAGNOSIS — Z53.20 LUNG CANCER SCREENING DECLINED BY PATIENT: ICD-10-CM

## 2022-08-10 DIAGNOSIS — J41.0 SIMPLE CHRONIC BRONCHITIS: ICD-10-CM

## 2022-08-10 DIAGNOSIS — R92.2 DENSE BREAST: ICD-10-CM

## 2022-08-10 DIAGNOSIS — F17.200 TOBACCO USE DISORDER: ICD-10-CM

## 2022-08-10 PROBLEM — Z71.85 VACCINE COUNSELING: Status: RESOLVED | Noted: 2021-02-23 | Resolved: 2022-08-10
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
## 2022-08-10 PROCEDURE — 99213 OFFICE O/P EST LOW 20 MIN: CPT | Performed by: FAMILY MEDICINE

## 2022-08-10 PROCEDURE — 99396 PREV VISIT EST AGE 40-64: CPT | Performed by: FAMILY MEDICINE

## 2022-08-10 RX ORDER — ALBUTEROL SULFATE 90 UG/1
2 POWDER, METERED RESPIRATORY (INHALATION) EVERY 4 HOURS PRN
Qty: 1 EACH | Refills: 3 | Status: SHIPPED | OUTPATIENT
Start: 2022-08-10 | End: 2022-10-18

## 2022-08-10 NOTE — ASSESSMENT & PLAN NOTE
Patient's (Body mass index is 31.97 kg/m².) indicates that they are obese (BMI >30) with health conditions that include hypertension and COPD  . Weight is improving with lifestyle modifications. BMI is is above average; BMI management plan is completed. We discussed portion control and increasing exercise.

## 2022-08-10 NOTE — PROGRESS NOTES
Subjective   Tara Cisse is a 61 y.o. female.     Chief Complaint   Patient presents with   • Annual Exam   • COPD   • Hyperlipidemia   • Hypertension       The patient is here: to discuss health maintenance and disease prevention to follow up on multiple medical problems.  Last comprehensive physical was on 6/22/2021.  Previous physical was performed by  Andrea Ennis MD  Overall has: moderate activity with work/home activities, exercises 1 time per week, poor appetite, decreased in their activity level, feels well with minor complaints, decreased energy level, is sleeping poorly and returned to full activity. Nutrition: balanced diet, eating a variety of foods and supplemental vitamins. Last tetanus shot was unknown. Patient is doing routine self breast exams: monthly Patient's last stress test was: 2/15/2019     Last Completed Mammogram          MAMMOGRAM (Every 2 Years) Next due on 7/19/2023 07/19/2021  Mammo Screening Digital Tomosynthesis Bilateral With CAD    12/02/2019  Mammo Screening Digital Tomosynthesis Bilateral With CAD    11/13/2017  Mammo Screening Digital Tomosynthesis Bilateral With CAD                Hypertension  This is a chronic problem. The current episode started more than 1 year ago. Associated symptoms include shortness of breath (she has COPD ). Pertinent negatives include no anxiety, blurred vision, chest pain, headaches, malaise/fatigue, neck pain, orthopnea, palpitations, peripheral edema, PND or sweats. Risk factors for coronary artery disease include obesity, post-menopausal state, family history and dyslipidemia. Current antihypertension treatment includes nothing. The current treatment provides mild improvement. There is no history of angina, kidney disease, CAD/MI, CVA, heart failure, left ventricular hypertrophy, PVD or retinopathy. There is no history of chronic renal disease, coarctation of the aorta, hyperaldosteronism, hypercortisolism, hyperparathyroidism, a  hypertension causing med, pheochromocytoma, renovascular disease, sleep apnea or a thyroid problem.   Hyperlipidemia  This is a chronic problem. The current episode started more than 1 year ago. Recent lipid tests were reviewed and are high. Exacerbating diseases include obesity. She has no history of chronic renal disease, diabetes, hypothyroidism, liver disease or nephrotic syndrome. Associated symptoms include leg pain and shortness of breath (she has COPD ). Pertinent negatives include no chest pain, focal sensory loss, focal weakness or myalgias. Current antihyperlipidemic treatment includes statins. Risk factors for coronary artery disease include hypertension, dyslipidemia and obesity.   COPD  She complains of cough, shortness of breath (she has COPD ) and wheezing. There is no chest tightness, difficulty breathing, frequent throat clearing, hemoptysis, hoarse voice or sputum production. Primary symptoms comments: She states that she was having some issues when it was very hot outside but she states that it has improved slightly for her since . This is a chronic problem. Pertinent negatives include no chest pain, headaches, malaise/fatigue, myalgias, PND, sweats or trouble swallowing. Her past medical history is significant for bronchitis.        Recent Hospitalizations:  No hospitalization(s) within the last year..  ccc      I personally reviewed and updated the patient's allergies, medications, problem list, and past medical, surgical, social, and family history. I have reviewed and confirmed the accuracy of the HPI and ROS as documented by the MA/LPN/RN Andrea Ennis MD    Family History   Problem Relation Age of Onset   • Hyperlipidemia Mother    • Diabetes Father    • Heart failure Father    • Hyperlipidemia Father    • Heart failure Paternal Grandmother    • Breast cancer Neg Hx    • Ovarian cancer Neg Hx        Social History     Tobacco Use   • Smoking status: Current Every Day Smoker     Packs/day:  1.00     Years: 39.00     Pack years: 39.00     Types: Cigarettes     Start date: 1980   • Smokeless tobacco: Never Used   Vaping Use   • Vaping Use: Former   • Start date: 4/13/2018   • Quit date: 5/15/2018   • Substances: Nicotine   Substance Use Topics   • Alcohol use: No   • Drug use: Never     Comment: gabapentin for knee prn       Past Surgical History:   Procedure Laterality Date   • ESSURE TUBAL LIGATION Bilateral    • TOTAL ABDOMINAL HYSTERECTOMY      ovaries remain, benign reasons, bleeding       Patient Active Problem List   Diagnosis   • Anemia due to vitamin B12 deficiency   • Vitamin B 12 deficiency   • Depressive disorder   • Gastroesophageal reflux disease without esophagitis   • Mixed hyperlipidemia   • Osteoarthritis of right knee   • Simple chronic bronchitis (HCC)   • Tobacco use disorder   • Annual visit for general adult medical examination with abnormal findings   • Encounter for screening for malignant neoplasm of breast   • Dense breast   • Menopausal syndrome   • Colon cancer screening   • Cervical cancer screening   • Class 1 obesity due to excess calories with serious comorbidity and body mass index (BMI) of 31.0 to 31.9 in adult   • Costochondral chest pain   • Other fatigue   • Nondisplaced fracture of lateral malleolus of left fibula, initial encounter for closed fracture   • Acute pain of left knee   • H/O: hysterectomy   • Lung cancer screening declined by patient   • Chronic nonseasonal allergic rhinitis due to pollen   • Leg swelling   • Hypertension         Current Outpatient Medications:   •  albuterol (PROVENTIL) (2.5 MG/3ML) 0.083% nebulizer solution, Take 2.5 mg by nebulization Every 4 (Four) Hours As Needed for Wheezing., Disp: 75 mL, Rfl: 12  •  aspirin 81 MG chewable tablet, Chew 81 mg Daily., Disp: , Rfl:   •  gabapentin (NEURONTIN) 100 MG capsule, Take 1 capsule by mouth 3 (Three) Times a Day., Disp: 90 capsule, Rfl: 2  •  omeprazole (priLOSEC) 40 MG capsule, Take 1  "capsule by mouth Daily., Disp: 30 capsule, Rfl: 12  •  simvastatin (ZOCOR) 40 MG tablet, Take 1 tablet by mouth every night at bedtime., Disp: 90 tablet, Rfl: 3  •  vitamin B-12 (CYANOCOBALAMIN) 500 MCG tablet, Take 500 mcg by mouth Daily. Vitamin B12 deficiency, Disp: , Rfl:     Review of Systems   Constitutional: Negative for chills, diaphoresis and malaise/fatigue.   HENT: Negative for hoarse voice, trouble swallowing and voice change.    Eyes: Negative for blurred vision and visual disturbance.   Respiratory: Positive for cough, shortness of breath (she has COPD ) and wheezing. Negative for hemoptysis and sputum production.    Cardiovascular: Negative for chest pain, palpitations, orthopnea and PND.   Gastrointestinal: Negative for abdominal pain and nausea.   Endocrine: Negative for polydipsia and polyphagia.   Genitourinary: Negative for hematuria.   Musculoskeletal: Negative for myalgias, neck pain and neck stiffness.   Skin: Negative for color change and pallor.   Allergic/Immunologic: Negative for immunocompromised state.   Neurological: Negative for focal weakness, seizures and syncope.   Hematological: Negative for adenopathy.   Psychiatric/Behavioral: Negative for hallucinations, sleep disturbance and suicidal ideas.       I have reviewed and confirmed the accuracy of the ROS as documented by the MA/LPN/RN Andrea Ennis MD      Objective   /73   Pulse 119   Temp 97.8 °F (36.6 °C)   Resp 18   Ht 157.5 cm (62\")   Wt 79.3 kg (174 lb 12.8 oz)   LMP  (LMP Unknown)   SpO2 98%   BMI 31.97 kg/m²   BP Readings from Last 3 Encounters:   08/10/22 134/73   04/05/22 150/80   12/14/21 120/80     Wt Readings from Last 3 Encounters:   08/10/22 79.3 kg (174 lb 12.8 oz)   04/05/22 77.6 kg (171 lb)   12/14/21 78.8 kg (173 lb 12.8 oz)     Physical Exam  Constitutional:       Appearance: She is well-developed. She is not diaphoretic.   HENT:      Head: Normocephalic.      Right Ear: Tympanic membrane, ear " canal and external ear normal.      Left Ear: Tympanic membrane, ear canal and external ear normal.      Nose: Nose normal.   Eyes:      General: Lids are normal.      Conjunctiva/sclera: Conjunctivae normal.      Pupils: Pupils are equal, round, and reactive to light.   Neck:      Thyroid: No thyromegaly.      Vascular: No carotid bruit or JVD.      Trachea: No tracheal deviation.   Cardiovascular:      Rate and Rhythm: Normal rate and regular rhythm.      Heart sounds: Normal heart sounds. No murmur heard.    No friction rub. No gallop.   Pulmonary:      Effort: Pulmonary effort is normal.      Breath sounds: Normal breath sounds. No stridor. No decreased breath sounds, wheezing or rales.   Abdominal:      General: Bowel sounds are normal. There is no distension.      Palpations: Abdomen is soft. There is no mass.      Tenderness: There is no abdominal tenderness. There is no guarding or rebound.      Hernia: No hernia is present.   Lymphadenopathy:      Head:      Right side of head: No submental, submandibular, tonsillar, preauricular, posterior auricular or occipital adenopathy.      Left side of head: No submental, submandibular, tonsillar, preauricular, posterior auricular or occipital adenopathy.      Cervical: No cervical adenopathy.   Skin:     General: Skin is warm and dry.      Coloration: Skin is not pale.   Neurological:      Mental Status: She is alert and oriented to person, place, and time.      Cranial Nerves: No cranial nerve deficit.      Sensory: No sensory deficit.      Coordination: Coordination normal.      Gait: Gait normal.      Deep Tendon Reflexes: Reflexes are normal and symmetric.         Data / Lab Results:    No results found for: HGBA1C     Lab Results   Component Value Date     (H) 06/21/2022     (H) 06/14/2021     (H) 12/17/2020     Lab Results   Component Value Date    CHOL 194 06/03/2019    CHOL 196 02/13/2019    CHOL 205 (H) 11/19/2018     Lab Results    Component Value Date    TRIG 202 (H) 06/21/2022    TRIG 147 06/14/2021    TRIG 173 (H) 12/17/2020     Lab Results   Component Value Date    HDL 42 06/21/2022    HDL 44 06/14/2021    HDL 47 12/17/2020     No results found for: PSA  Lab Results   Component Value Date    WBC 7.9 06/21/2022    HGB 15.1 06/21/2022    HCT 45.3 06/21/2022    MCV 92 06/21/2022     06/21/2022     Lab Results   Component Value Date    TSH 1.440 06/21/2022    [id number] 1.19 08/04/2016      Lab Results   Component Value Date    GLUCOSE 94 06/21/2022    BUN 16 06/21/2022    CREATININE 0.86 06/21/2022    EGFRIFNONA 72 06/14/2021    EGFRIFAFRI 83 06/14/2021    BCR 19 06/21/2022    K 4.5 06/21/2022    CO2 24 06/21/2022    CALCIUM 9.7 06/21/2022    PROTENTOTREF 6.7 06/21/2022    ALBUMIN 4.4 06/21/2022    LABIL2 1.9 06/21/2022    AST 17 06/21/2022    ALT 16 06/21/2022     No results found for: MAKSIM, RF, SEDRATE   No results found for: CRP   No results found for: IRON, TIBC, FERRITIN   Lab Results   Component Value Date    [id number] 06/14/2021        Age-appropriate Screening Schedule:  Refer to the list below for future screening recommendations based on patient's age, sex and/or medical conditions. Orders for these recommended tests are listed in the plan section. The patient has been provided with a written plan.    Health Maintenance   Topic Date Due   • TDAP/TD VACCINES (1 - Tdap) Never done   • ZOSTER VACCINE (1 of 2) Never done   • INFLUENZA VACCINE  10/01/2022   • LIPID PANEL  06/21/2023   • MAMMOGRAM  07/19/2023   • PAP SMEAR  Discontinued           Assessment & Plan      Medications        Problem List         LOS    Physical.  Doing well, vaccines current.  COVID-vaccine declined.  Discussed coated baby aspirin daily.  Discussed health maintenance, screening test, lifestyle modification.  Mammogram scheduled  Knee pain.    Much improved today status post knee injection.  Chronic/recurrent, likely secondary to OA.  X-ray benign  2019.  Has had orthopedics eval, good response to steroid injection, repeated 4/21..  Doing well on gabapentin, taking Rx infrequently..  Ice, rehabilitation exercises consider orthopedics follow-up if persistent symptoms.  Claudication.  Clinically improved today, VINNIE normal.  Stop smoking.  Allergic rhinitis.  History of elevated blood pressure with Claritin-D, okay to reattempt plain Claritin.  Start nasal steroids. Call return persistent symptoms.  Tobacco use.  Encourage cessation.  Has cut back.  Recommend DEXA scan, low-dose CT scan lung cancer screening she states she will consider.  Hyperlipidemia.  Tolerating increased dose simvastatin.    Persistent elevation LDL, discussed diet, exercise, lifestyle modification, recheck 1 year.  Consider change to atorvastatin if persistent elevation.  Cardiac stress testing benign 2019.  Colon cancer screening: Recommend colonoscopy she states she will consider after the first of the year.         Diagnoses and all orders for this visit:    1. Annual visit for general adult medical examination with abnormal findings (Primary)    2. Mixed hyperlipidemia    3. Hypertension, unspecified type    4. Simple chronic bronchitis (HCC)    5. Menopausal syndrome    6. Colon cancer screening    7. Cervical cancer screening    8. Encounter for screening mammogram for malignant neoplasm of breast    9. Dense breast    10. Tobacco use disorder    11. Lung cancer screening declined by patient    12. Class 1 obesity due to excess calories with serious comorbidity and body mass index (BMI) of 31.0 to 31.9 in adult  Assessment & Plan:  Patient's (Body mass index is 31.97 kg/m².) indicates that they are obese (BMI >30) with health conditions that include hypertension and COPD  . Weight is improving with lifestyle modifications. BMI is is above average; BMI management plan is completed. We discussed portion control and increasing exercise.               Expected course, medications, and  adverse effects discussed.  Call or return if worsening or persistent symptoms.  I wore protective equipment throughout this patient encounter including a mask, gloves, and eye protection.  Hand hygiene was performed before donning protective equipment and after removal when leaving the room. The complete contents of the Assessment and Plan and Data / Lab Results as documented above have been reviewed and addressed by myself with the patient today as part of an ongoing evaluation / treatment plan.  If some of the documentation has been copied from a previous note and is unchanged it indicates that this problem / plan has been assessed today but is stable from a previous visit and no changes have been recommended.The separate E/M service provided today is significant, medically necessary, and separately identifiable.

## 2022-08-30 ENCOUNTER — HOSPITAL ENCOUNTER (OUTPATIENT)
Dept: MAMMOGRAPHY | Facility: HOSPITAL | Age: 62
Discharge: HOME | End: 2022-08-30
Admitting: FAMILY MEDICINE

## 2022-08-30 DIAGNOSIS — Z12.31 ENCOUNTER FOR SCREENING MAMMOGRAM FOR MALIGNANT NEOPLASM OF BREAST: ICD-10-CM

## 2022-08-30 PROCEDURE — 77063 BREAST TOMOSYNTHESIS BI: CPT

## 2022-08-30 PROCEDURE — 77067 SCR MAMMO BI INCL CAD: CPT

## 2022-10-18 DIAGNOSIS — J41.0 SIMPLE CHRONIC BRONCHITIS: ICD-10-CM

## 2022-10-18 RX ORDER — ALBUTEROL SULFATE 90 UG/1
AEROSOL, METERED RESPIRATORY (INHALATION)
Qty: 8.5 G | Refills: 0 | Status: SHIPPED | OUTPATIENT
Start: 2022-10-18

## 2022-11-22 ENCOUNTER — TELEPHONE (OUTPATIENT)
Dept: FAMILY MEDICINE CLINIC | Facility: CLINIC | Age: 62
End: 2022-11-22

## 2022-11-22 NOTE — TELEPHONE ENCOUNTER
Patient called requesting tessalon Pearls and an antibiotic to be sent to [facility] for cough and congestion. She had tested negative via home test for COVID and states she gets bronchitis every year this time.

## 2022-11-28 DIAGNOSIS — J06.9 UPPER RESPIRATORY TRACT INFECTION, UNSPECIFIED TYPE: Primary | ICD-10-CM

## 2022-11-28 RX ORDER — AZITHROMYCIN 250 MG/1
TABLET, FILM COATED ORAL
Qty: 6 TABLET | Refills: 0 | Status: SHIPPED | OUTPATIENT
Start: 2022-11-28 | End: 2023-03-28

## 2022-12-07 ENCOUNTER — TELEPHONE (OUTPATIENT)
Dept: FAMILY MEDICINE CLINIC | Facility: CLINIC | Age: 62
End: 2022-12-07

## 2022-12-07 DIAGNOSIS — J06.9 URI WITH COUGH AND CONGESTION: Primary | ICD-10-CM

## 2022-12-07 RX ORDER — CEPHALEXIN 500 MG/1
500 CAPSULE ORAL 3 TIMES DAILY
Qty: 30 CAPSULE | Refills: 0 | Status: SHIPPED | OUTPATIENT
Start: 2022-12-07 | End: 2023-03-28

## 2022-12-07 NOTE — TELEPHONE ENCOUNTER
Okay to send Keflex 3 times a day for 10 days she should let me know if she does not improve, thanks

## 2022-12-07 NOTE — TELEPHONE ENCOUNTER
Caller: DEANNA AGUIRRE    Relationship: Emergency Contact    Best call back number: [phone]    What medication are you requesting: ANTIBIOTICS    What are your current symptoms: HEAD AND CHEST CONGESTION    How long have you been experiencing symptoms: 3 WEEKS    Have you had these symptoms before:    [] Yes  [x] No    Have you been treated for these symptoms before:   [] Yes  [x] No    If a prescription is needed, what is your preferred pharmacy and phone number:  [id number] [facility] OF  &  - [phone] [facility] [phone] FX

## 2023-03-27 NOTE — PROGRESS NOTES
Subjective   Tara Cisse is a 62 y.o. female.     Chief Complaint   Patient presents with   • Shoulder Pain       History of Present Illness  Tara is here for left shoulder pain. She states she was lifting a 6 gallon jug and since this her shoulder hurts off and on. This happened a month ago. Pain is a 4/10 right now but increases to 8/10 with movement, position, lifting, and weather. Pain is described as stiffness and decrease ROM. Pain does radiate down arm, when she sleeps on it. Treatments tried are aleve and icy hot with moderate relief.            I personally reviewed and updated the patient's allergies, medications, problem list, and past medical, surgical, social, and family history. I have reviewed and confirmed the accuracy of the History of Present Illness and Review of Symptoms as documented by the MA/LPN/RN. Andrea Ennis MD    Family History   Problem Relation Age of Onset   • Hyperlipidemia Mother    • Diabetes Father    • Heart failure Father    • Hyperlipidemia Father    • Heart failure Paternal Grandmother    • Breast cancer Neg Hx    • Ovarian cancer Neg Hx        Social History     Tobacco Use   • Smoking status: Every Day     Packs/day: 1.00     Years: 43.00     Pack years: 43.00     Types: Cigarettes     Start date: 1980   • Smokeless tobacco: Never   Vaping Use   • Vaping Use: Former   • Start date: 4/13/2018   • Quit date: 5/15/2018   • Substances: Nicotine   • Devices: Disposable   Substance Use Topics   • Alcohol use: No   • Drug use: Never     Comment: gabapentin for knee prn       Past Surgical History:   Procedure Laterality Date   • ESSURE TUBAL LIGATION Bilateral    • TOTAL ABDOMINAL HYSTERECTOMY      ovaries remain, benign reasons, bleeding       Patient Active Problem List   Diagnosis   • Anemia due to vitamin B12 deficiency   • Vitamin B 12 deficiency   • Depressive disorder   • Gastroesophageal reflux disease without esophagitis   • Mixed hyperlipidemia   • Osteoarthritis  of right knee   • Simple chronic bronchitis (HCC)   • Tobacco use disorder   • Annual visit for general adult medical examination with abnormal findings   • Encounter for screening for malignant neoplasm of breast   • Dense breast   • Menopausal syndrome   • Colon cancer screening   • Cervical cancer screening   • Class 1 obesity due to excess calories with serious comorbidity and body mass index (BMI) of 31.0 to 31.9 in adult   • Costochondral chest pain   • Other fatigue   • Nondisplaced fracture of lateral malleolus of left fibula, initial encounter for closed fracture   • Acute pain of left knee   • H/O: hysterectomy   • Lung cancer screening declined by patient   • Chronic nonseasonal allergic rhinitis due to pollen   • Leg swelling   • Hypertension   • Tendinitis of left rotator cuff         Current Outpatient Medications:   •  albuterol sulfate  (90 Base) MCG/ACT inhaler, INHALE 2 PUFFS BY MOUTH EVERY 4 HOURS AS NEEDED FOR WHEEZING, Disp: 8.5 g, Rfl: 0  •  aspirin 81 MG chewable tablet, Chew 1 tablet Daily., Disp: , Rfl:   •  gabapentin (NEURONTIN) 100 MG capsule, Take 1 capsule by mouth 3 (Three) Times a Day., Disp: 90 capsule, Rfl: 2  •  omeprazole (priLOSEC) 40 MG capsule, Take 1 capsule by mouth Daily., Disp: 30 capsule, Rfl: 12  •  simvastatin (ZOCOR) 40 MG tablet, Take 1 tablet by mouth every night at bedtime., Disp: 90 tablet, Rfl: 3  •  vitamin B-12 (CYANOCOBALAMIN) 500 MCG tablet, Take 1 tablet by mouth Daily. Vitamin B12 deficiency, Disp: , Rfl:   •  cyclobenzaprine (FLEXERIL) 10 MG tablet, Take 0.5-1 tablets by mouth At Night As Needed for Muscle Spasms., Disp: 30 tablet, Rfl: 1  •  meloxicam (MOBIC) 15 MG tablet, Take 1 tablet by mouth Daily As Needed for Moderate Pain., Disp: 30 tablet, Rfl: 1         Review of Systems   Constitutional: Negative for chills and diaphoresis.   Eyes: Negative for visual disturbance.   Respiratory: Negative for shortness of breath.    Cardiovascular: Negative  "for chest pain and palpitations.   Gastrointestinal: Negative for abdominal pain and nausea.   Endocrine: Negative for polydipsia and polyphagia.   Musculoskeletal: Negative for neck stiffness.   Skin: Negative for color change and pallor.   Neurological: Negative for seizures and syncope.   Hematological: Negative for adenopathy.   Psychiatric/Behavioral: Negative for hallucinations and suicidal ideas.       I have reviewed and confirmed the accuracy of the ROS as documented by the MA/LPN/RN Andrea Ennis MD      Objective   /82 (BP Location: Right arm, Patient Position: Sitting, Cuff Size: Adult)   Pulse 105   Temp 98.4 °F (36.9 °C) (Temporal)   Resp 16   Ht 157.5 cm (62\")   Wt 78.7 kg (173 lb 9.6 oz)   LMP  (LMP Unknown)   SpO2 97%   BMI 31.75 kg/m²   BP Readings from Last 3 Encounters:   03/28/23 126/82   08/10/22 134/73   04/05/22 150/80     Wt Readings from Last 3 Encounters:   03/28/23 78.7 kg (173 lb 9.6 oz)   08/10/22 79.3 kg (174 lb 12.8 oz)   04/05/22 77.6 kg (171 lb)     Physical Exam  Constitutional:       Appearance: Normal appearance. She is well-developed. She is not diaphoretic.   Cardiovascular:      Rate and Rhythm: Normal rate and regular rhythm.      Pulses: Normal pulses.      Heart sounds: Normal heart sounds, S1 normal and S2 normal. No murmur heard.    No friction rub. No gallop.   Pulmonary:      Effort: Pulmonary effort is normal. No accessory muscle usage.      Breath sounds: Normal breath sounds. No stridor. No decreased breath sounds, wheezing, rhonchi or rales.   Abdominal:      General: Bowel sounds are normal. There is no distension.      Palpations: Abdomen is soft. Abdomen is not rigid. There is no mass or pulsatile mass.      Tenderness: There is no abdominal tenderness. There is no guarding or rebound. Negative signs include Argueta's sign.      Hernia: No hernia is present.   Musculoskeletal:      Right shoulder: Normal. No swelling, deformity, effusion or " crepitus. Normal range of motion. Normal strength.      Left shoulder: Normal. No swelling, deformity, effusion, tenderness or crepitus. Normal range of motion. Normal strength.      Cervical back: No swelling, deformity, spasms or tenderness.   Skin:     General: Skin is warm and dry.      Coloration: Skin is not pale.      Comments: Small actinic keratosis right hand   Neurological:      Mental Status: She is alert and oriented to person, place, and time.      Coordination: Coordination normal.      Gait: Gait normal.         Data / Lab Results:    No results found for: HGBA1C     Lab Results   Component Value Date     (H) 06/21/2022     (H) 06/14/2021     (H) 12/17/2020     Lab Results   Component Value Date    CHOL 194 06/03/2019    CHOL 196 02/13/2019    CHOL 205 (H) 11/19/2018     Lab Results   Component Value Date    TRIG 202 (H) 06/21/2022    TRIG 147 06/14/2021    TRIG 173 (H) 12/17/2020     Lab Results   Component Value Date    HDL 42 06/21/2022    HDL 44 06/14/2021    HDL 47 12/17/2020     No results found for: PSA  Lab Results   Component Value Date    WBC 7.9 06/21/2022    HGB 15.1 06/21/2022    HCT 45.3 06/21/2022    MCV 92 06/21/2022     06/21/2022     Lab Results   Component Value Date    TSH 1.440 06/21/2022    T7ZKSDP 1.19 08/04/2016    THYROIDAB <9 12/17/2020      Lab Results   Component Value Date    GLUCOSE 94 06/21/2022    BUN 16 06/21/2022    CREATININE 0.86 06/21/2022    EGFRIFNONA 72 06/14/2021    EGFRIFAFRI 83 06/14/2021    BCR 19 06/21/2022    K 4.5 06/21/2022    CO2 24 06/21/2022    CALCIUM 9.7 06/21/2022    PROTENTOTREF 6.7 06/21/2022    ALBUMIN 4.4 06/21/2022    LABIL2 1.9 06/21/2022    AST 17 06/21/2022    ALT 16 06/21/2022     No results found for: MAKSIM, RF, SEDRATE   No results found for: CRP   No results found for: IRON, TIBC, FERRITIN   Lab Results   Component Value Date    LKPSLACW26 617 06/14/2021          Assessment & Plan      Medications         Problem List         LOS    Rotator cuff tendinitis.  Strain.  Overall benign exam today with some left trapezius spasm.  Ice, LSATs, rehabilitation exercise discussed.  Start nightly muscle relaxant.  Consider joint injection if persistent symptoms.  Health maintenance.  Doing well, vaccines current.  COVID-vaccine declined.  Discussed coated baby aspirin daily.  Discussed health maintenance, screening test, lifestyle modification.  Mammogram scheduled  Knee pain.    Much improved today status post knee injection.  Chronic/recurrent, likely secondary to OA.  X-ray benign 2019.  Has had orthopedics eval, good response to steroid injection, repeated 4/21..  Doing well on gabapentin, taking Rx infrequently..  Ice, rehabilitation exercises consider orthopedics follow-up if persistent symptoms.  Claudication.  Clinically improved today, VINNIE normal.  Stop smoking.  Allergic rhinitis.  History of elevated blood pressure with Claritin-D, okay to reattempt plain Claritin.  Start nasal steroids. Call return persistent symptoms.  Tobacco use.  Encourage cessation.  Has cut back.  Recommend DEXA scan, low-dose CT scan lung cancer screening she states she will consider.  Hyperlipidemia.  Tolerating increased dose simvastatin.    Persistent elevation LDL, discussed diet, exercise, lifestyle modification, recheck 1 year.  Consider change to atorvastatin if persistent elevation.  Cardiac stress testing benign 2019.  Colon cancer screening: Recommend colonoscopy she states she will consider after the first of the year.  Actinic keratosis.  Right hand.  Frozen today.  Topical care discussed.  Consider repeat freezing resection if persistent symptoms.      Diagnoses and all orders for this visit:    1. Acute pain of left shoulder (Primary)  -     meloxicam (MOBIC) 15 MG tablet; Take 1 tablet by mouth Daily As Needed for Moderate Pain.  Dispense: 30 tablet; Refill: 1  -     cyclobenzaprine (FLEXERIL) 10 MG tablet; Take 0.5-1 tablets  by mouth At Night As Needed for Muscle Spasms.  Dispense: 30 tablet; Refill: 1    2. Class 1 obesity due to excess calories with serious comorbidity and body mass index (BMI) of 31.0 to 31.9 in adult  Assessment & Plan:  Patient's (Body mass index is 31.75 kg/m².) indicates that they are obese (BMI >30) with health conditions that include hypertension . Weight is unchanged. BMI  is above average; BMI management plan is completed. We discussed portion control and increasing exercise.       3. Tobacco use disorder    4. Tendinitis of left rotator cuff            Expected course, medications, and adverse effects discussed.  Call or return if worsening or persistent symptoms.  I wore protective equipment throughout this patient encounter including a mask, gloves, and eye protection.  Hand hygiene was performed before donning protective equipment and after removal when leaving the room. The complete contents of the Assessment and Plan and Data/Lab Results as documented above have been reviewed and addressed by myself with the patient today as part of an ongoing evaluation / treatment plan.  If some of the documentation has been copied from a previous note and is unchanged it indicates that this problem / plan has been assessed today but is stable from a previous visit and no changes have been recommended.

## 2023-03-28 ENCOUNTER — OFFICE VISIT (OUTPATIENT)
Dept: FAMILY MEDICINE CLINIC | Facility: CLINIC | Age: 63
End: 2023-03-28
Payer: COMMERCIAL

## 2023-03-28 VITALS
DIASTOLIC BLOOD PRESSURE: 82 MMHG | SYSTOLIC BLOOD PRESSURE: 126 MMHG | OXYGEN SATURATION: 97 % | HEART RATE: 105 BPM | TEMPERATURE: 98.4 F | WEIGHT: 173.6 LBS | HEIGHT: 62 IN | BODY MASS INDEX: 31.94 KG/M2 | RESPIRATION RATE: 16 BRPM

## 2023-03-28 DIAGNOSIS — F17.200 TOBACCO USE DISORDER: ICD-10-CM

## 2023-03-28 DIAGNOSIS — M25.512 ACUTE PAIN OF LEFT SHOULDER: Primary | ICD-10-CM

## 2023-03-28 DIAGNOSIS — E66.09 CLASS 1 OBESITY DUE TO EXCESS CALORIES WITH SERIOUS COMORBIDITY AND BODY MASS INDEX (BMI) OF 31.0 TO 31.9 IN ADULT: ICD-10-CM

## 2023-03-28 DIAGNOSIS — M75.82 TENDINITIS OF LEFT ROTATOR CUFF: ICD-10-CM

## 2023-03-28 PROCEDURE — 99213 OFFICE O/P EST LOW 20 MIN: CPT | Performed by: FAMILY MEDICINE

## 2023-03-28 RX ORDER — MELOXICAM 15 MG/1
15 TABLET ORAL DAILY PRN
Qty: 30 TABLET | Refills: 1 | Status: SHIPPED | OUTPATIENT
Start: 2023-03-28

## 2023-03-28 RX ORDER — CYCLOBENZAPRINE HCL 10 MG
5-10 TABLET ORAL NIGHTLY PRN
Qty: 30 TABLET | Refills: 1 | Status: SHIPPED | OUTPATIENT
Start: 2023-03-28

## 2023-03-28 NOTE — ASSESSMENT & PLAN NOTE
Patient's (Body mass index is 31.75 kg/m².) indicates that they are obese (BMI >30) with health conditions that include hypertension . Weight is unchanged. BMI  is above average; BMI management plan is completed. We discussed portion control and increasing exercise.

## 2023-05-25 DIAGNOSIS — M25.512 ACUTE PAIN OF LEFT SHOULDER: ICD-10-CM

## 2023-05-25 RX ORDER — MELOXICAM 15 MG/1
15 TABLET ORAL DAILY PRN
Qty: 30 TABLET | Refills: 1 | Status: SHIPPED | OUTPATIENT
Start: 2023-05-25

## 2023-10-27 DIAGNOSIS — K21.9 GASTROESOPHAGEAL REFLUX DISEASE WITHOUT ESOPHAGITIS: ICD-10-CM

## 2023-10-27 RX ORDER — OMEPRAZOLE 40 MG/1
40 CAPSULE, DELAYED RELEASE ORAL DAILY
Qty: 30 CAPSULE | Refills: 12 | Status: SHIPPED | OUTPATIENT
Start: 2023-10-27

## 2023-11-20 DIAGNOSIS — E55.9 VITAMIN D DEFICIENCY: ICD-10-CM

## 2023-11-20 DIAGNOSIS — I10 HYPERTENSION, UNSPECIFIED TYPE: ICD-10-CM

## 2023-11-20 DIAGNOSIS — R53.81 MALAISE AND FATIGUE: ICD-10-CM

## 2023-11-20 DIAGNOSIS — R53.83 MALAISE AND FATIGUE: ICD-10-CM

## 2023-11-20 DIAGNOSIS — D51.3 OTHER DIETARY VITAMIN B12 DEFICIENCY ANEMIA: ICD-10-CM

## 2023-11-20 DIAGNOSIS — E78.2 MIXED HYPERLIPIDEMIA: Primary | ICD-10-CM

## 2023-11-21 LAB
25(OH)D3+25(OH)D2 SERPL-MCNC: 19.2 NG/ML (ref 30–100)
ALBUMIN SERPL-MCNC: 4.4 G/DL (ref 3.8–4.9)
ALBUMIN/GLOB SERPL: 1.9 {RATIO} (ref 1.2–2.2)
ALP SERPL-CCNC: 97 IU/L (ref 44–121)
ALT SERPL-CCNC: 18 IU/L (ref 0–32)
AST SERPL-CCNC: 19 IU/L (ref 0–40)
BASOPHILS # BLD AUTO: 0.1 X10E3/UL (ref 0–0.2)
BASOPHILS NFR BLD AUTO: 1 %
BILIRUB SERPL-MCNC: 0.3 MG/DL (ref 0–1.2)
BUN SERPL-MCNC: 18 MG/DL (ref 8–27)
BUN/CREAT SERPL: 25 (ref 12–28)
CALCIUM SERPL-MCNC: 9.5 MG/DL (ref 8.7–10.3)
CHLORIDE SERPL-SCNC: 106 MMOL/L (ref 96–106)
CHOLEST SERPL-MCNC: 180 MG/DL (ref 100–199)
CHOLEST/HDLC SERPL: 4 RATIO (ref 0–4.4)
CO2 SERPL-SCNC: 24 MMOL/L (ref 20–29)
CREAT SERPL-MCNC: 0.72 MG/DL (ref 0.57–1)
EGFRCR SERPLBLD CKD-EPI 2021: 96 ML/MIN/1.73
EOSINOPHIL # BLD AUTO: 0.1 X10E3/UL (ref 0–0.4)
EOSINOPHIL NFR BLD AUTO: 2 %
ERYTHROCYTE [DISTWIDTH] IN BLOOD BY AUTOMATED COUNT: 12.4 % (ref 11.7–15.4)
FOLATE SERPL-MCNC: 4.4 NG/ML
GLOBULIN SER CALC-MCNC: 2.3 G/DL (ref 1.5–4.5)
GLUCOSE SERPL-MCNC: 90 MG/DL (ref 70–99)
HCT VFR BLD AUTO: 42.1 % (ref 34–46.6)
HDLC SERPL-MCNC: 45 MG/DL
HGB BLD-MCNC: 13.9 G/DL (ref 11.1–15.9)
IMM GRANULOCYTES # BLD AUTO: 0 X10E3/UL (ref 0–0.1)
IMM GRANULOCYTES NFR BLD AUTO: 0 %
LDLC SERPL CALC-MCNC: 108 MG/DL (ref 0–99)
LYMPHOCYTES # BLD AUTO: 2.6 X10E3/UL (ref 0.7–3.1)
LYMPHOCYTES NFR BLD AUTO: 36 %
MCH RBC QN AUTO: 29.8 PG (ref 26.6–33)
MCHC RBC AUTO-ENTMCNC: 33 G/DL (ref 31.5–35.7)
MCV RBC AUTO: 90 FL (ref 79–97)
MONOCYTES # BLD AUTO: 0.3 X10E3/UL (ref 0.1–0.9)
MONOCYTES NFR BLD AUTO: 5 %
NEUTROPHILS # BLD AUTO: 4 X10E3/UL (ref 1.4–7)
NEUTROPHILS NFR BLD AUTO: 56 %
PLATELET # BLD AUTO: 267 X10E3/UL (ref 150–450)
POTASSIUM SERPL-SCNC: 4.4 MMOL/L (ref 3.5–5.2)
PROT SERPL-MCNC: 6.7 G/DL (ref 6–8.5)
RBC # BLD AUTO: 4.67 X10E6/UL (ref 3.77–5.28)
SODIUM SERPL-SCNC: 142 MMOL/L (ref 134–144)
TRIGL SERPL-MCNC: 154 MG/DL (ref 0–149)
TSH SERPL DL<=0.005 MIU/L-ACNC: 0.52 UIU/ML (ref 0.45–4.5)
VIT B12 SERPL-MCNC: 820 PG/ML (ref 232–1245)
VLDLC SERPL CALC-MCNC: 27 MG/DL (ref 5–40)
WBC # BLD AUTO: 7.1 X10E3/UL (ref 3.4–10.8)

## 2023-11-22 NOTE — PROGRESS NOTES
Subjective   Tara Cisse is a 62 y.o. female.     Chief Complaint   Patient presents with    Annual Exam    Hypertension    Hyperlipidemia       The patient is here: to discuss health maintenance and disease prevention.  Last comprehensive physical was on 8/10/2022.  Previous physical was performed by  Andrea Ennis MD  Overall has: moderate activity with work/home activities, good appetite, feels well with no complaints, good energy level, and is sleeping poorly. Nutrition: appropriate balanced diet. Last tetanus shot was  8/12/2021 . Patient is doing routine self breast exams: monthly Patient's last stress test was:  2/15/2019      Last Completed Mammogram            Ordered - MAMMOGRAM (Every 2 Years) Ordered on 11/27/2023 08/30/2022  Mammo Screening Digital Tomosynthesis Bilateral With CAD    07/19/2021  Mammo Screening Digital Tomosynthesis Bilateral With CAD    12/02/2019  Mammo Screening Digital Tomosynthesis Bilateral With CAD    11/13/2017  Mammo Screening Digital Tomosynthesis Bilateral With CAD                    History of Present Illness  Tara does check her Bp at home- she states it is usually unless she has a lot of salt in her diet. When she has salt, it'll be in the 140's over 90's. She denies any visual changes, chest pain or SOB.  Hypertension  This is a chronic problem. The current episode started more than 1 year ago. Pertinent negatives include no anxiety, blurred vision, chest pain, headaches, malaise/fatigue, neck pain, orthopnea, palpitations, peripheral edema, PND, shortness of breath (she has COPD ) or sweats. Risk factors for coronary artery disease include obesity, post-menopausal state, family history and dyslipidemia. Current antihypertension treatment includes nothing. The current treatment provides mild improvement. There is no history of angina, kidney disease, CAD/MI, CVA, heart failure, left ventricular hypertrophy, PVD or retinopathy. There is no history of chronic  renal disease, coarctation of the aorta, hyperaldosteronism, hypercortisolism, hyperparathyroidism, a hypertension causing med, pheochromocytoma, renovascular disease, sleep apnea or a thyroid problem.   Hyperlipidemia  This is a chronic problem. The current episode started more than 1 year ago. Recent lipid tests were reviewed and are high. Exacerbating diseases include obesity. She has no history of chronic renal disease, diabetes, hypothyroidism, liver disease or nephrotic syndrome. Associated symptoms include leg pain. Pertinent negatives include no chest pain, focal sensory loss, focal weakness, myalgias or shortness of breath (she has COPD ). Current antihyperlipidemic treatment includes statins. Risk factors for coronary artery disease include hypertension, dyslipidemia and obesity.        Recent Hospitalizations:  No hospitalization(s) within the last year..  ccc      I personally reviewed and updated the patient's allergies, medications, problem list, and past medical, surgical, social, and family history. I have reviewed and confirmed the accuracy of the HPI and ROS as documented by the MA/LPN/RN Andrea Ennis MD    Family History   Problem Relation Age of Onset    Hyperlipidemia Mother     Diabetes Father     Heart failure Father     Hyperlipidemia Father     Heart failure Paternal Grandmother     Breast cancer Neg Hx     Ovarian cancer Neg Hx        Social History     Tobacco Use    Smoking status: Every Day     Packs/day: 1.00     Years: 43.00     Additional pack years: 0.00     Total pack years: 43.00     Types: Cigarettes     Start date: 1/1/1980     Passive exposure: Never    Smokeless tobacco: Never   Vaping Use    Vaping Use: Former    Start date: 4/13/2018    Quit date: 5/15/2018    Substances: Nicotine    Devices: Disposable   Substance Use Topics    Alcohol use: No    Drug use: Never     Comment: gabapentin for knee prn       Past Surgical History:   Procedure Laterality Date    ESSURE TUBAL  LIGATION Bilateral     TOTAL ABDOMINAL HYSTERECTOMY      ovaries remain, benign reasons, bleeding       Patient Active Problem List   Diagnosis    Anemia due to vitamin B12 deficiency    Vitamin B 12 deficiency    Depressive disorder    Gastroesophageal reflux disease without esophagitis    Mixed hyperlipidemia    Osteoarthritis of right knee    Simple chronic bronchitis    Tobacco use disorder    Annual visit for general adult medical examination with abnormal findings    Encounter for screening for malignant neoplasm of breast    Dense breast    Menopausal syndrome    Colon cancer screening    Cervical cancer screening    Class 1 obesity due to excess calories with serious comorbidity and body mass index (BMI) of 31.0 to 31.9 in adult    Costochondral chest pain    Other fatigue    Nondisplaced fracture of lateral malleolus of left fibula, initial encounter for closed fracture    Acute pain of left knee    H/O: hysterectomy    Lung cancer screening declined by patient    Chronic nonseasonal allergic rhinitis due to pollen    Leg swelling    Hypertension    Tendinitis of left rotator cuff    Acute bacterial bronchitis         Current Outpatient Medications:     albuterol sulfate  (90 Base) MCG/ACT inhaler, INHALE 2 PUFFS BY MOUTH EVERY 4 HOURS AS NEEDED FOR WHEEZING, Disp: 8.5 g, Rfl: 0    aspirin 81 MG chewable tablet, Chew 1 tablet Daily., Disp: , Rfl:     Loratadine (Claritin) 10 MG chewable tablet, Chew., Disp: , Rfl:     vitamin B-12 (CYANOCOBALAMIN) 500 MCG tablet, Take 1 tablet by mouth Daily. Vitamin B12 deficiency, Disp: , Rfl:     azithromycin (ZITHROMAX) 250 MG tablet, Take 2 tablets the first day, then 1 tablet daily for 4 days., Disp: 6 tablet, Rfl: 0    cyclobenzaprine (FLEXERIL) 10 MG tablet, Take 0.5-1 tablets by mouth At Night As Needed for Muscle Spasms. (Patient not taking: Reported on 11/27/2023), Disp: 30 tablet, Rfl: 1    gabapentin (NEURONTIN) 100 MG capsule, Take 1 capsule by mouth 3  "(Three) Times a Day. (Patient not taking: Reported on 11/27/2023), Disp: 90 capsule, Rfl: 2    meloxicam (MOBIC) 15 MG tablet, TAKE 1 TABLET BY MOUTH DAILY AS NEEDED FOR MODERATE PAIN, Disp: 30 tablet, Rfl: 1    omeprazole (priLOSEC) 40 MG capsule, TAKE 1 CAPSULE BY MOUTH DAILY (Patient not taking: Reported on 11/27/2023), Disp: 30 capsule, Rfl: 12    rosuvastatin (CRESTOR) 20 MG tablet, Take 1 tablet by mouth Daily., Disp: 30 tablet, Rfl: 12    Review of Systems   Constitutional:  Negative for chills, diaphoresis and malaise/fatigue.   HENT:  Negative for trouble swallowing and voice change.    Eyes:  Negative for blurred vision and visual disturbance.   Respiratory:  Negative for shortness of breath (she has COPD ).    Cardiovascular:  Negative for chest pain, palpitations, orthopnea and PND.   Gastrointestinal:  Negative for abdominal pain and nausea.   Endocrine: Negative for polydipsia and polyphagia.   Genitourinary:  Negative for hematuria.   Musculoskeletal:  Negative for myalgias, neck pain and neck stiffness.   Skin:  Negative for color change and pallor.   Allergic/Immunologic: Negative for immunocompromised state.   Neurological:  Negative for focal weakness, seizures and syncope.   Hematological:  Negative for adenopathy.   Psychiatric/Behavioral:  Negative for hallucinations, sleep disturbance and suicidal ideas.        I have reviewed and confirmed the accuracy of the ROS as documented by the MA/LPN/RN Andrea Ennis MD      Objective   /92 (BP Location: Right arm, Patient Position: Sitting, Cuff Size: Adult)   Pulse 111   Temp 98 °F (36.7 °C) (Temporal)   Resp 18   Ht 157.5 cm (62.01\")   Wt 77.3 kg (170 lb 6.4 oz)   LMP  (LMP Unknown)   SpO2 97%   BMI 31.16 kg/m²   BP Readings from Last 3 Encounters:   11/27/23 132/92   03/28/23 126/82   08/10/22 134/73     Wt Readings from Last 3 Encounters:   11/27/23 77.3 kg (170 lb 6.4 oz)   03/28/23 78.7 kg (173 lb 9.6 oz)   08/10/22 79.3 kg (174 " "lb 12.8 oz)     Physical Exam  Constitutional:       Appearance: She is well-developed. She is not diaphoretic.   HENT:      Head: Normocephalic.      Right Ear: Tympanic membrane, ear canal and external ear normal.      Left Ear: Tympanic membrane, ear canal and external ear normal.      Nose: Nose normal.   Eyes:      General: Lids are normal.      Conjunctiva/sclera: Conjunctivae normal.      Pupils: Pupils are equal, round, and reactive to light.   Neck:      Thyroid: No thyromegaly.      Vascular: No carotid bruit or JVD.      Trachea: No tracheal deviation.   Cardiovascular:      Rate and Rhythm: Normal rate and regular rhythm.      Heart sounds: Normal heart sounds. No murmur heard.     No friction rub. No gallop.   Pulmonary:      Effort: Pulmonary effort is normal.      Breath sounds: Normal breath sounds. No stridor. No decreased breath sounds, wheezing or rales.   Abdominal:      General: Bowel sounds are normal. There is no distension.      Palpations: Abdomen is soft. There is no mass.      Tenderness: There is no abdominal tenderness. There is no guarding or rebound.      Hernia: No hernia is present.   Lymphadenopathy:      Head:      Right side of head: No submental, submandibular, tonsillar, preauricular, posterior auricular or occipital adenopathy.      Left side of head: No submental, submandibular, tonsillar, preauricular, posterior auricular or occipital adenopathy.      Cervical: No cervical adenopathy.   Skin:     General: Skin is warm and dry.      Coloration: Skin is not pale.   Neurological:      Mental Status: She is alert and oriented to person, place, and time.      Cranial Nerves: No cranial nerve deficit.      Sensory: No sensory deficit.      Coordination: Coordination normal.      Gait: Gait normal.      Deep Tendon Reflexes: Reflexes are normal and symmetric.         Data / Lab Results:    No results found for: \"HGBA1C\"     Lab Results   Component Value Date     (H) " "11/20/2023     (H) 06/21/2022     (H) 06/14/2021     Lab Results   Component Value Date    CHOL 194 06/03/2019    CHOL 196 02/13/2019    CHOL 205 (H) 11/19/2018     Lab Results   Component Value Date    TRIG 154 (H) 11/20/2023    TRIG 202 (H) 06/21/2022    TRIG 147 06/14/2021     Lab Results   Component Value Date    HDL 45 11/20/2023    HDL 42 06/21/2022    HDL 44 06/14/2021     No results found for: \"PSA\"  Lab Results   Component Value Date    WBC 7.1 11/20/2023    HGB 13.9 11/20/2023    HCT 42.1 11/20/2023    MCV 90 11/20/2023     11/20/2023     Lab Results   Component Value Date    TSH 0.521 11/20/2023    F4RRPMZ 1.19 08/04/2016    THYROIDAB <9 12/17/2020     Lab Results   Component Value Date    GLUCOSE 90 11/20/2023    BUN 18 11/20/2023    CREATININE 0.72 11/20/2023    EGFRIFNONA 72 06/14/2021    EGFRIFAFRI 83 06/14/2021    BCR 25 11/20/2023    K 4.4 11/20/2023    CO2 24 11/20/2023    CALCIUM 9.5 11/20/2023    PROTENTOTREF 6.7 11/20/2023    ALBUMIN 4.4 11/20/2023    LABIL2 1.9 11/20/2023    AST 19 11/20/2023    ALT 18 11/20/2023     No results found for: \"MAKSIM\", \"RF\", \"SEDRATE\"   No results found for: \"CRP\"   No results found for: \"IRON\", \"TIBC\", \"FERRITIN\"   Lab Results   Component Value Date    YHTDXJTR31 820 11/20/2023        Age-appropriate Screening Schedule:  Refer to the list below for future screening recommendations based on patient's age, sex and/or medical conditions. Orders for these recommended tests are listed in the plan section. The patient has been provided with a written plan.    Health Maintenance   Topic Date Due    COLORECTAL CANCER SCREENING  Never done    COVID-19 Vaccine (1) Never done    ZOSTER VACCINE (1 of 2) Never done    LUNG CANCER SCREENING  Never done    Pneumococcal Vaccine 0-64 (2 - PCV) 11/19/2019    BMI FOLLOWUP  03/28/2024    MAMMOGRAM  08/30/2024    LIPID PANEL  11/20/2024    ANNUAL PHYSICAL  11/27/2024    TDAP/TD VACCINES (3 - Td or Tdap) 08/12/2031    " HEPATITIS C SCREENING  Completed    PAP SMEAR  Discontinued           Assessment & Plan      Medications        Problem List         LOS    Physical.  Doing well, vaccines current.  COVID-vaccine declined.  Discussed coated baby aspirin daily.  Discussed health maintenance, screening test, lifestyle modification.  Mammogram benign 8/22, recheck.  Acute bacterial bronchitis.  Start antibiotics. Amari or refutrn if fever or worsening symptoms.  Knee pain.    Much improved today status post knee injection.  Chronic/recurrent, likely secondary to OA.  X-ray benign 2019.  Has had orthopedics eval, good response to steroid injection, repeated 4/21..  Doing well on gabapentin, taking Rx infrequently..  Ice, rehabilitation exercises consider orthopedics follow-up if persistent symptoms.  Claudication.  Clinically improved today, VINNIE normal.  Stop smoking.  Allergic rhinitis.  History of elevated blood pressure with Claritin-D, okay to reattempt plain Claritin.  Start nasal steroids. Call return persistent symptoms.  Tobacco use.  Encourage cessation.  Has cut back.  Recommend DEXA scan she states she will consider , low-dose CT scan lung cancer screening scheduled.  Recommend carotid Doppler she states she will consider.    Hyperlipidemia.  Persistent elevation on  increased dose simvastatin, change to rosuvastatin.    Persistent elevation LDL, discussed diet, exercise, lifestyle modification, recheck 1 year.  Consider change to atorvastatin if persistent elevation.  Cardiac stress testing benign 2019 (treadmill cardiolyte).  Colon cancer screening: Recommend colonoscopy she agrees this year, [facility] referral scheduled.  Actinic keratosis.  Right hand.  Imporved / resolved today s/p freezing. Topical care discussed.  Consider repeat freezing resection if persistent symptoms.  Rotator cuff tendinitis.  Strain.  Overall benign exam today with some left trapezius spasm.  Ice, LSATs, rehabilitation exercise discussed.  Start nightly  muscle relaxant.  Consider joint injection if persistent symptoms.    Diagnoses and all orders for this visit:    1. Annual visit for general adult medical examination with abnormal findings (Primary)    2. Hypertension, unspecified type    3. Mixed hyperlipidemia  -     rosuvastatin (CRESTOR) 20 MG tablet; Take 1 tablet by mouth Daily.  Dispense: 30 tablet; Refill: 12    4. Class 1 obesity due to excess calories with serious comorbidity and body mass index (BMI) of 31.0 to 31.9 in adult    5. Cervical cancer screening    6. Colon cancer screening    7. Encounter for screening mammogram for malignant neoplasm of breast  -     Mammo Screening Digital Tomosynthesis Bilateral With CAD; Future    8. Tobacco use disorder  -      CT Chest Low Dose Cancer Screening WO; Future    9. Cough, unspecified type  -     azithromycin (ZITHROMAX) 250 MG tablet; Take 2 tablets the first day, then 1 tablet daily for 4 days.  Dispense: 6 tablet; Refill: 0    10. Acute bacterial bronchitis              Expected course, medications, and adverse effects discussed.  Call or return if worsening or persistent symptoms.  I wore protective equipment throughout this patient encounter including a mask, gloves, and eye protection.  Hand hygiene was performed before donning protective equipment and after removal when leaving the room. The complete contents of the Assessment and Plan and Data / Lab Results as documented above have been reviewed and addressed by myself with the patient today as part of an ongoing evaluation / treatment plan.  If some of the documentation has been copied from a previous note and is unchanged it indicates that this problem / plan has been assessed today but is stable from a previous visit and no changes have been recommended.  The separate E/M service provided today is significant, medically necessary, and separately identifiable.

## 2023-11-27 ENCOUNTER — OFFICE VISIT (OUTPATIENT)
Dept: FAMILY MEDICINE CLINIC | Facility: CLINIC | Age: 63
End: 2023-11-27
Payer: COMMERCIAL

## 2023-11-27 VITALS
WEIGHT: 170.4 LBS | RESPIRATION RATE: 18 BRPM | HEART RATE: 111 BPM | HEIGHT: 62 IN | BODY MASS INDEX: 31.36 KG/M2 | OXYGEN SATURATION: 97 % | DIASTOLIC BLOOD PRESSURE: 92 MMHG | SYSTOLIC BLOOD PRESSURE: 132 MMHG | TEMPERATURE: 98 F

## 2023-11-27 DIAGNOSIS — Z00.01 ANNUAL VISIT FOR GENERAL ADULT MEDICAL EXAMINATION WITH ABNORMAL FINDINGS: Primary | ICD-10-CM

## 2023-11-27 DIAGNOSIS — B96.89 ACUTE BACTERIAL BRONCHITIS: ICD-10-CM

## 2023-11-27 DIAGNOSIS — Z12.11 COLON CANCER SCREENING: ICD-10-CM

## 2023-11-27 DIAGNOSIS — J20.8 ACUTE BACTERIAL BRONCHITIS: ICD-10-CM

## 2023-11-27 DIAGNOSIS — E78.2 MIXED HYPERLIPIDEMIA: ICD-10-CM

## 2023-11-27 DIAGNOSIS — R05.9 COUGH, UNSPECIFIED TYPE: ICD-10-CM

## 2023-11-27 DIAGNOSIS — Z12.4 CERVICAL CANCER SCREENING: ICD-10-CM

## 2023-11-27 DIAGNOSIS — F17.200 TOBACCO USE DISORDER: ICD-10-CM

## 2023-11-27 DIAGNOSIS — Z12.31 ENCOUNTER FOR SCREENING MAMMOGRAM FOR MALIGNANT NEOPLASM OF BREAST: ICD-10-CM

## 2023-11-27 DIAGNOSIS — I10 HYPERTENSION, UNSPECIFIED TYPE: ICD-10-CM

## 2023-11-27 DIAGNOSIS — E66.09 CLASS 1 OBESITY DUE TO EXCESS CALORIES WITH SERIOUS COMORBIDITY AND BODY MASS INDEX (BMI) OF 31.0 TO 31.9 IN ADULT: ICD-10-CM

## 2023-11-27 RX ORDER — ROSUVASTATIN CALCIUM 20 MG/1
20 TABLET, COATED ORAL DAILY
Qty: 30 TABLET | Refills: 12 | Status: SHIPPED | OUTPATIENT
Start: 2023-11-27

## 2023-11-27 RX ORDER — AZITHROMYCIN 250 MG/1
TABLET, FILM COATED ORAL
Qty: 6 TABLET | Refills: 0 | Status: SHIPPED | OUTPATIENT
Start: 2023-11-27

## 2023-12-01 ENCOUNTER — TELEPHONE (OUTPATIENT)
Dept: FAMILY MEDICINE CLINIC | Facility: CLINIC | Age: 63
End: 2023-12-01

## 2023-12-01 NOTE — TELEPHONE ENCOUNTER
Caller: Tara Cisse    Relationship: Self    Best call back number: [phone]     What was the call regarding:  PATIENT IS REQUESTING FOR THE CT SCAN/ X RAY THAT DR. CASTRO ORDERED FOR HER TO BE SENT TO [facility] IN [place]. IT IS CHEAPER FOR PATIENT TO GET THIS DONE AT [facility] THAN [facility].    PLEASE ADVISE

## 2023-12-01 NOTE — TELEPHONE ENCOUNTER
Per Irene in referrals this is going to have to be changed through [place]. She will send when processed

## 2023-12-07 ENCOUNTER — TELEPHONE (OUTPATIENT)
Dept: FAMILY MEDICINE CLINIC | Facility: CLINIC | Age: 63
End: 2023-12-07

## 2023-12-07 NOTE — TELEPHONE ENCOUNTER
Caller: Tara Cisse    Relationship: Self    Best call back number: [phone]     What medication are you requesting: ANTIBIOTIC     What are your current symptoms: COUGH, YELLOW MUCUS      If a prescription is needed, what is your preferred pharmacy and phone number: [id number] - [place], [place] - [address] [facility] [address] AT [facility] OF  [address] - [phone]  - [phone] FX     Additional notes: PATIENT FINISHED THE FIRST MEDICATION DR. CASTRO PRESCRIBED ON 11.27.23 BUT PATIENTS COUGH HAS NOT GONE AWAY. PATIENT NOW HAS YELLOW MUCUS THAT SHE IS COUGHING UP    PLEASE ADVISE

## 2023-12-08 ENCOUNTER — OFFICE VISIT (OUTPATIENT)
Dept: FAMILY MEDICINE CLINIC | Facility: CLINIC | Age: 63
End: 2023-12-08
Payer: COMMERCIAL

## 2023-12-08 VITALS — WEIGHT: 157.5 LBS | BODY MASS INDEX: 28.98 KG/M2 | HEIGHT: 62 IN

## 2023-12-08 DIAGNOSIS — E66.09 CLASS 1 OBESITY DUE TO EXCESS CALORIES WITH SERIOUS COMORBIDITY AND BODY MASS INDEX (BMI) OF 31.0 TO 31.9 IN ADULT: ICD-10-CM

## 2023-12-08 DIAGNOSIS — R05.1 ACUTE COUGH: Primary | ICD-10-CM

## 2023-12-08 DIAGNOSIS — F17.200 TOBACCO USE DISORDER: ICD-10-CM

## 2023-12-08 PROCEDURE — 99213 OFFICE O/P EST LOW 20 MIN: CPT | Performed by: FAMILY MEDICINE

## 2023-12-08 RX ORDER — HYDROCODONE BITARTRATE AND HOMATROPINE METHYLBROMIDE ORAL SOLUTION 5; 1.5 MG/5ML; MG/5ML
LIQUID ORAL
Qty: 180 ML | Refills: 0 | Status: SHIPPED | OUTPATIENT
Start: 2023-12-08

## 2023-12-08 RX ORDER — CEPHALEXIN 500 MG/1
500 CAPSULE ORAL 3 TIMES DAILY
Qty: 30 CAPSULE | Refills: 0 | Status: SHIPPED | OUTPATIENT
Start: 2023-12-08

## 2023-12-08 RX ORDER — PREDNISONE 5 MG/1
TABLET ORAL
Qty: 45 TABLET | Refills: 0 | Status: CANCELLED | OUTPATIENT
Start: 2023-12-08

## 2023-12-08 NOTE — PROGRESS NOTES
Subjective   Tara Cisse is a 62 y.o. female.     Chief Complaint   Patient presents with    Cough       Cough  This is a recurrent problem. The current episode started 1 to 4 weeks ago. The problem has been waxing and waning. The cough is Productive of sputum. Associated symptoms include ear congestion, headaches, nasal congestion and rhinorrhea. Pertinent negatives include no chest pain, chills, ear pain, fever, sore throat or shortness of breath. The symptoms are aggravated by cold air and exercise. She has tried a beta-agonist inhaler (Zpac) for the symptoms. The treatment provided moderate relief.            I personally reviewed and updated the patient's allergies, medications, problem list, and past medical, surgical, social, and family history. I have reviewed and confirmed the accuracy of the History of Present Illness and Review of Symptoms as documented by the MA/CTN/RN. Amber Paulson MA    Family History   Problem Relation Age of Onset    Hyperlipidemia Mother     Diabetes Father     Heart failure Father     Hyperlipidemia Father     Heart failure Paternal Grandmother     Breast cancer Neg Hx     Ovarian cancer Neg Hx        Social History     Tobacco Use    Smoking status: Every Day     Packs/day: 1.00     Years: 43.00     Additional pack years: 0.00     Total pack years: 43.00     Types: Cigarettes     Start date: 1/1/1980     Passive exposure: Never    Smokeless tobacco: Never   Vaping Use    Vaping Use: Former    Start date: 4/13/2018    Quit date: 5/15/2018    Substances: Nicotine    Devices: Disposable   Substance Use Topics    Alcohol use: No    Drug use: Never     Comment: gabapentin for knee prn       Past Surgical History:   Procedure Laterality Date    ESSURE TUBAL LIGATION Bilateral     TOTAL ABDOMINAL HYSTERECTOMY      ovaries remain, benign reasons, bleeding       Patient Active Problem List   Diagnosis    Anemia due to vitamin B12 deficiency    Vitamin B 12 deficiency    Depressive  disorder    Gastroesophageal reflux disease without esophagitis    Mixed hyperlipidemia    Osteoarthritis of right knee    Simple chronic bronchitis    Tobacco use disorder    Annual visit for general adult medical examination with abnormal findings    Encounter for screening for malignant neoplasm of breast    Dense breast    Menopausal syndrome    Colon cancer screening    Cervical cancer screening    Class 1 obesity due to excess calories with serious comorbidity and body mass index (BMI) of 31.0 to 31.9 in adult    Costochondral chest pain    Other fatigue    Nondisplaced fracture of lateral malleolus of left fibula, initial encounter for closed fracture    Acute pain of left knee    H/O: hysterectomy    Lung cancer screening declined by patient    Chronic nonseasonal allergic rhinitis due to pollen    Leg swelling    Hypertension    Tendinitis of left rotator cuff    Acute bacterial bronchitis         Current Outpatient Medications:     albuterol sulfate  (90 Base) MCG/ACT inhaler, INHALE 2 PUFFS BY MOUTH EVERY 4 HOURS AS NEEDED FOR WHEEZING, Disp: 8.5 g, Rfl: 0    aspirin 81 MG chewable tablet, Chew 1 tablet Daily., Disp: , Rfl:     Loratadine (Claritin) 10 MG chewable tablet, Chew., Disp: , Rfl:     meloxicam (MOBIC) 15 MG tablet, TAKE 1 TABLET BY MOUTH DAILY AS NEEDED FOR MODERATE PAIN, Disp: 30 tablet, Rfl: 1    rosuvastatin (CRESTOR) 20 MG tablet, Take 1 tablet by mouth Daily., Disp: 30 tablet, Rfl: 12    vitamin B-12 (CYANOCOBALAMIN) 500 MCG tablet, Take 1 tablet by mouth Daily. Vitamin B12 deficiency, Disp: , Rfl:     cyclobenzaprine (FLEXERIL) 10 MG tablet, Take 0.5-1 tablets by mouth At Night As Needed for Muscle Spasms. (Patient not taking: Reported on 11/27/2023), Disp: 30 tablet, Rfl: 1    gabapentin (NEURONTIN) 100 MG capsule, Take 1 capsule by mouth 3 (Three) Times a Day. (Patient not taking: Reported on 11/27/2023), Disp: 90 capsule, Rfl: 2    omeprazole (priLOSEC) 40 MG capsule, TAKE 1  "CAPSULE BY MOUTH DAILY (Patient not taking: Reported on 11/27/2023), Disp: 30 capsule, Rfl: 12         Review of Systems   Constitutional:  Negative for chills, diaphoresis and fever.   HENT:  Positive for rhinorrhea. Negative for ear pain and sore throat.    Eyes:  Negative for visual disturbance.   Respiratory:  Positive for cough. Negative for shortness of breath.    Cardiovascular:  Negative for chest pain and palpitations.   Gastrointestinal:  Negative for abdominal pain and nausea.   Endocrine: Negative for polydipsia and polyphagia.   Musculoskeletal:  Negative for neck stiffness.   Skin:  Negative for color change and pallor.   Neurological:  Negative for seizures and syncope.   Hematological:  Negative for adenopathy.       I have reviewed and confirmed the accuracy of the ROS as documented by the MA/LPN/RN Amber Paulson MA      Objective   Ht 157.5 cm (62.01\")   Wt 71.4 kg (157 lb 8 oz)   LMP  (LMP Unknown)   BMI 28.80 kg/m²   BP Readings from Last 3 Encounters:   11/27/23 132/92   03/28/23 126/82   08/10/22 134/73     Wt Readings from Last 3 Encounters:   12/08/23 71.4 kg (157 lb 8 oz)   11/27/23 77.3 kg (170 lb 6.4 oz)   03/28/23 78.7 kg (173 lb 9.6 oz)     Physical Exam  Constitutional:       Appearance: Normal appearance.   Neurological:      Mental Status: She is alert.         Data / Lab Results:    No results found for: \"HGBA1C\"     Lab Results   Component Value Date     (H) 11/20/2023     (H) 06/21/2022     (H) 06/14/2021     Lab Results   Component Value Date    CHOL 194 06/03/2019    CHOL 196 02/13/2019    CHOL 205 (H) 11/19/2018     Lab Results   Component Value Date    TRIG 154 (H) 11/20/2023    TRIG 202 (H) 06/21/2022    TRIG 147 06/14/2021     Lab Results   Component Value Date    HDL 45 11/20/2023    HDL 42 06/21/2022    HDL 44 06/14/2021     No results found for: \"PSA\"  Lab Results   Component Value Date    WBC 7.1 11/20/2023    HGB 13.9 11/20/2023    HCT 42.1 " "11/20/2023    MCV 90 11/20/2023     11/20/2023     Lab Results   Component Value Date    TSH 0.521 11/20/2023    N4WPIRZ 1.19 08/04/2016    THYROIDAB <9 12/17/2020     Lab Results   Component Value Date    GLUCOSE 90 11/20/2023    BUN 18 11/20/2023    CREATININE 0.72 11/20/2023    EGFRIFNONA 72 06/14/2021    EGFRIFAFRI 83 06/14/2021    BCR 25 11/20/2023    K 4.4 11/20/2023    CO2 24 11/20/2023    CALCIUM 9.5 11/20/2023    PROTENTOTREF 6.7 11/20/2023    ALBUMIN 4.4 11/20/2023    LABIL2 1.9 11/20/2023    AST 19 11/20/2023    ALT 18 11/20/2023     No results found for: \"MAKSIM\", \"RF\", \"SEDRATE\"   No results found for: \"CRP\"   No results found for: \"IRON\", \"TIBC\", \"FERRITIN\"   Lab Results   Component Value Date    [id number] 820 11/20/2023        Tara Cisse consented to undergo a video visit today.  This format was necessitated by the current covid-19 virus pandemic.  During the encounter I was located in the office, Tara Cisse was located in their personal residence.      Assessment & Plan      Medications        Problem List         LOS    Health maintenance.  Doing well, vaccines current.  COVID-vaccine declined.  Discussed coated baby aspirin daily.  Discussed health maintenance, screening test, lifestyle modification.  Mammogram benign 8/22, recheck.  Acute bacterial bronchitis.  Persistent today, repeat course antibiotics.  Does not tolerate prednisone.  Amari or refutrn if fever or worsening symptoms.  Knee pain.    Much improved today status post knee injection.  Chronic/recurrent, likely secondary to OA.  X-ray benign 2019.  Has had orthopedics eval, good response to steroid injection, repeated 4/21..  Doing well on gabapentin, taking Rx infrequently..  Ice, rehabilitation exercises consider orthopedics follow-up if persistent symptoms.  Claudication.  Clinically improved today, VINNIE normal.  Stop smoking.  Allergic rhinitis.  History of elevated blood pressure with Claritin-D, okay to reattempt " plain Claritin.  Start nasal steroids. Call return persistent symptoms.  Tobacco use.  Encourage cessation.  Has cut back.  Recommend DEXA scan she states she will consider , low-dose CT scan lung cancer screening scheduled.  Recommend carotid Doppler she states she will consider.    Hyperlipidemia.  Persistent elevation on  increased dose simvastatin, change to rosuvastatin.    Persistent elevation LDL, discussed diet, exercise, lifestyle modification, recheck 1 year.  Consider change to atorvastatin if persistent elevation.  Cardiac stress testing benign 2019 (treadmill cardiolyte).  Colon cancer screening: Recommend colonoscopy she agrees this year, [facility] referral scheduled.  Actinic keratosis.  Right hand.  Imporved / resolved today s/p freezing. Topical care discussed.  Consider repeat freezing resection if persistent symptoms.  Rotator cuff tendinitis.  Strain.  Overall benign exam today with some left trapezius spasm.  Ice, LSATs, rehabilitation exercise discussed.  Start nightly muscle relaxant.  Consider joint injection if persistent symptoms.        Diagnoses and all orders for this visit:    1. Acute cough (Primary)    2. Class 1 obesity due to excess calories with serious comorbidity and body mass index (BMI) of 31.0 to 31.9 in adult    3. Tobacco use disorder              Expected course, medications, and adverse effects discussed.  Call or return if worsening or persistent symptoms.  The complete contents of the Assessment and Plan and Data / Lab Results as documented above have been reviewed and addressed by myself with the patient today as part of an ongoing evaluation / treatment plan.  If some of the documentation has been copied from a previous note and is unchanged it indicates that this problem / plan has been assessed today but is stable from a previous visit and no changes have been recommended.

## 2023-12-15 ENCOUNTER — HOSPITAL ENCOUNTER (OUTPATIENT)
Dept: MAMMOGRAPHY | Facility: HOSPITAL | Age: 63
Discharge: HOME | End: 2023-12-15
Admitting: FAMILY MEDICINE
Payer: COMMERCIAL

## 2023-12-15 DIAGNOSIS — Z12.31 ENCOUNTER FOR SCREENING MAMMOGRAM FOR MALIGNANT NEOPLASM OF BREAST: ICD-10-CM

## 2023-12-15 PROCEDURE — 77067 SCR MAMMO BI INCL CAD: CPT

## 2023-12-15 PROCEDURE — 77063 BREAST TOMOSYNTHESIS BI: CPT

## 2023-12-19 DIAGNOSIS — F17.200 TOBACCO USE DISORDER: ICD-10-CM

## 2023-12-20 ENCOUNTER — TELEPHONE (OUTPATIENT)
Dept: FAMILY MEDICINE CLINIC | Facility: CLINIC | Age: 63
End: 2023-12-20
Payer: COMMERCIAL

## 2023-12-20 DIAGNOSIS — R91.8 LUNG NODULES: Primary | ICD-10-CM

## 2024-07-29 NOTE — PROGRESS NOTES
Subjective   Tara Cisse is a 63 y.o. female.     Chief Complaint   Patient presents with    Leg Swelling    Leg Pain       Leg Pain   The incident occurred 3 to 5 days ago. Incident location: in vehicle. There was no injury mechanism. The pain is present in the left leg, left hip and left thigh. The quality of the pain is described as aching and burning. The pain is at a severity of 8/10. The pain is moderate. The pain has been Intermittent since onset. Associated symptoms include tingling. Pertinent negatives include no inability to bear weight, muscle weakness or numbness. She reports no foreign bodies present. The symptoms are aggravated by weight bearing. Treatments tried: aleve, Icyhot topical. The treatment provided moderate relief.            I personally reviewed and updated the patient's allergies, medications, problem list, and past medical, surgical, social, and family history. I have reviewed and confirmed the accuracy of the History of Present Illness and Review of Symptoms as documented by the MA/LPN/RN. Andrea Ennis MD    Family History   Problem Relation Age of Onset    Hyperlipidemia Mother     Diabetes Father     Heart failure Father     Hyperlipidemia Father     Heart failure Paternal Grandmother     Breast cancer Neg Hx     Ovarian cancer Neg Hx        Social History     Tobacco Use    Smoking status: Every Day     Current packs/day: 1.00     Average packs/day: 1 pack/day for 44.6 years (44.6 ttl pk-yrs)     Types: Cigarettes     Start date: 1/1/1980     Passive exposure: Never    Smokeless tobacco: Never   Vaping Use    Vaping status: Former    Start date: 4/13/2018    Quit date: 5/15/2018    Substances: Nicotine    Devices: Disposable   Substance Use Topics    Alcohol use: No    Drug use: Never     Comment: gabapentin for knee prn       Past Surgical History:   Procedure Laterality Date    ESSURE TUBAL LIGATION Bilateral     TOTAL ABDOMINAL HYSTERECTOMY      ovaries remain, benign  reasons, bleeding       Patient Active Problem List   Diagnosis    Anemia due to vitamin B12 deficiency    Vitamin B 12 deficiency    Depressive disorder    Gastroesophageal reflux disease without esophagitis    Mixed hyperlipidemia    Osteoarthritis of right knee    Simple chronic bronchitis    Tobacco use disorder    Annual visit for general adult medical examination with abnormal findings    Encounter for screening for malignant neoplasm of breast    Dense breast    Menopausal syndrome    Colon cancer screening    Cervical cancer screening    Class 1 obesity due to excess calories with serious comorbidity and body mass index (BMI) of 31.0 to 31.9 in adult    Costochondral chest pain    Other fatigue    Nondisplaced fracture of lateral malleolus of left fibula, initial encounter for closed fracture    Acute pain of left knee    H/O: hysterectomy    Lung cancer screening declined by patient    Chronic nonseasonal allergic rhinitis due to pollen    Leg swelling    Hypertension    Tendinitis of left rotator cuff    Acute bacterial bronchitis    Acute left-sided low back pain with left-sided sciatica         Current Outpatient Medications:     albuterol sulfate  (90 Base) MCG/ACT inhaler, INHALE 2 PUFFS BY MOUTH EVERY 4 HOURS AS NEEDED FOR WHEEZING, Disp: 8.5 g, Rfl: 0    aspirin 81 MG chewable tablet, Chew 1 tablet Daily., Disp: , Rfl:     Loratadine (Claritin) 10 MG chewable tablet, Chew., Disp: , Rfl:     omeprazole (priLOSEC) 40 MG capsule, TAKE 1 CAPSULE BY MOUTH DAILY, Disp: 30 capsule, Rfl: 12    rosuvastatin (CRESTOR) 20 MG tablet, Take 1 tablet by mouth Daily., Disp: 30 tablet, Rfl: 12    vitamin B-12 (CYANOCOBALAMIN) 500 MCG tablet, Take 1 tablet by mouth Daily. Vitamin B12 deficiency, Disp: , Rfl:     cyclobenzaprine (FLEXERIL) 10 MG tablet, Take 0.5-1 tablets by mouth At Night As Needed for Muscle Spasms., Disp: 30 tablet, Rfl: 2         Review of Systems   Constitutional:  Negative for chills and  "diaphoresis.   Eyes:  Negative for visual disturbance.   Respiratory:  Negative for shortness of breath.    Cardiovascular:  Negative for chest pain and palpitations.   Gastrointestinal:  Negative for abdominal pain and nausea.   Endocrine: Negative for polydipsia and polyphagia.   Musculoskeletal:  Negative for neck stiffness.   Skin:  Negative for color change and pallor.   Neurological:  Positive for tingling. Negative for seizures, syncope and numbness.   Hematological:  Negative for adenopathy.   Psychiatric/Behavioral:  Negative for hallucinations and suicidal ideas.        I have reviewed and confirmed the accuracy of the ROS as documented by the MA/LPN/RN Andrea Ennis MD      Objective   /90 (BP Location: Left arm, Patient Position: Sitting, Cuff Size: Large Adult)   Pulse 118   Temp 98.4 °F (36.9 °C)   Resp 18   Ht 157.5 cm (62\")   Wt 77.1 kg (170 lb)   LMP  (LMP Unknown)   SpO2 96%   BMI 31.09 kg/m²   BP Readings from Last 3 Encounters:   07/30/24 124/90   11/27/23 132/92   03/28/23 126/82     Wt Readings from Last 3 Encounters:   07/30/24 77.1 kg (170 lb)   12/08/23 71.4 kg (157 lb 8 oz)   11/27/23 77.3 kg (170 lb 6.4 oz)     Physical Exam  Constitutional:       Appearance: She is well-developed. She is not diaphoretic.   HENT:      Head: Normocephalic.      Right Ear: Tympanic membrane, ear canal and external ear normal.      Left Ear: Tympanic membrane, ear canal and external ear normal.      Nose: Nose normal.   Eyes:      General: Lids are normal.      Conjunctiva/sclera: Conjunctivae normal.      Pupils: Pupils are equal, round, and reactive to light.   Neck:      Thyroid: No thyromegaly.      Vascular: No carotid bruit or JVD.      Trachea: No tracheal deviation.   Cardiovascular:      Rate and Rhythm: Normal rate and regular rhythm.      Heart sounds: Normal heart sounds. No murmur heard.     No friction rub. No gallop.   Pulmonary:      Effort: Pulmonary effort is normal.      " "Breath sounds: Normal breath sounds. No stridor. No decreased breath sounds, wheezing or rales.   Abdominal:      General: Bowel sounds are normal. There is no distension.      Palpations: Abdomen is soft. There is no mass.      Tenderness: There is no abdominal tenderness. There is no guarding or rebound.      Hernia: No hernia is present.   Lymphadenopathy:      Head:      Right side of head: No submental, submandibular, tonsillar, preauricular, posterior auricular or occipital adenopathy.      Left side of head: No submental, submandibular, tonsillar, preauricular, posterior auricular or occipital adenopathy.      Cervical: No cervical adenopathy.   Skin:     General: Skin is warm and dry.      Coloration: Skin is not pale.   Neurological:      Mental Status: She is alert and oriented to person, place, and time.      Cranial Nerves: No cranial nerve deficit.      Sensory: No sensory deficit.      Coordination: Coordination normal.      Gait: Gait normal.      Deep Tendon Reflexes: Reflexes are normal and symmetric.         Data / Lab Results:    No results found for: \"HGBA1C\"     Lab Results   Component Value Date     (H) 11/20/2023     (H) 06/21/2022     (H) 06/14/2021     Lab Results   Component Value Date    CHOL 194 06/03/2019    CHOL 196 02/13/2019    CHOL 205 (H) 11/19/2018     Lab Results   Component Value Date    TRIG 154 (H) 11/20/2023    TRIG 202 (H) 06/21/2022    TRIG 147 06/14/2021     Lab Results   Component Value Date    HDL 45 11/20/2023    HDL 42 06/21/2022    HDL 44 06/14/2021     No results found for: \"PSA\"  Lab Results   Component Value Date    WBC 7.1 11/20/2023    HGB 13.9 11/20/2023    HCT 42.1 11/20/2023    MCV 90 11/20/2023     11/20/2023     Lab Results   Component Value Date    TSH 0.521 11/20/2023    S1HTNYP 1.19 08/04/2016    THYROIDAB <9 12/17/2020      Lab Results   Component Value Date    GLUCOSE 90 11/20/2023    BUN 18 11/20/2023    CREATININE 0.72 " "11/20/2023    EGFRIFNONA 72 06/14/2021    EGFRIFAFRI 83 06/14/2021    BCR 25 11/20/2023    K 4.4 11/20/2023    CO2 24 11/20/2023    CALCIUM 9.5 11/20/2023    PROTENTOTREF 6.7 11/20/2023    ALBUMIN 4.4 11/20/2023    LABIL2 1.9 11/20/2023    AST 19 11/20/2023    ALT 18 11/20/2023     No results found for: \"MAKSIM\", \"RF\", \"SEDRATE\"   No results found for: \"CRP\"   No results found for: \"IRON\", \"TIBC\", \"FERRITIN\"   Lab Results   Component Value Date    [id number] 820 11/20/2023          Assessment & Plan      Medications        Problem List         LOS      Health maintenance.  Doing well, vaccines current.  COVID-vaccine declined.  Discussed coated baby aspirin daily.  Discussed health maintenance, screening test, lifestyle modification.  Mammogram benign 8/22, recheck.  Acute bacterial bronchitis.  Persistent today, repeat course antibiotics.  Does not tolerate prednisone.  Amari or refutrn if fever or worsening symptoms.  Knee pain.    Much improved today status post knee injection.  Chronic/recurrent, likely secondary to OA.  X-ray benign 2019.  Has had orthopedics eval, good response to steroid injection, repeated 4/21..  Doing well on gabapentin, taking Rx infrequently..  Ice, rehabilitation exercises consider orthopedics follow-up if persistent symptoms.  Claudication.  Clinically improved today, VINNIE normal.  Stop smoking.  Allergic rhinitis.  History of elevated blood pressure with Claritin-D, okay to reattempt plain Claritin.  Start nasal steroids. Call return persistent symptoms.  Tobacco use.  Encourage cessation.  Has cut back.  Recommend DEXA scan she states she will consider , low-dose CT scan lung cancer screening scheduled.  Recommend carotid Doppler she states she will consider.    Hyperlipidemia.  Persistent elevation on  increased dose simvastatin, change to rosuvastatin.    Persistent elevation LDL, discussed diet, exercise, lifestyle modification, recheck 1 year.  Consider change to atorvastatin if " persistent elevation.  Cardiac stress testing benign 2019 (treadmill cardiolyte).  Colon cancer screening: Recommend colonoscopy she agrees this year, [facility] referral scheduled.  Actinic keratosis.  Right hand.  Imporved / resolved today s/p freezing. Topical care discussed.  Consider repeat freezing resection if persistent symptoms.  Rotator cuff tendinitis.  Strain.  Overall benign exam today with some left trapezius spasm.  Ice, LSATs, rehabilitation exercise discussed.  Start nightly muscle relaxant.  Consider joint injection if persistent symptoms.  Low back pain.  Overall benign exam today, left lower extremity radiculopathy, likely secondary lumbar disc disease.  Intermittent symptoms, infrequent episodes currently.  Continue NSAIDs, she does not tolerate prednisone.  Start as needed nightly muscle relaxant, rehabilitation exercise discussed.  PT referral declined.  Follow-up recheck.  Consider imaging if worsening symptoms.    Diagnoses and all orders for this visit:    1. Pain of left lower extremity (Primary)    2. Tobacco use disorder    3. Class 1 obesity due to excess calories with serious comorbidity and body mass index (BMI) of 31.0 to 31.9 in adult    4. Muscle spasm  -     cyclobenzaprine (FLEXERIL) 10 MG tablet; Take 0.5-1 tablets by mouth At Night As Needed for Muscle Spasms.  Dispense: 30 tablet; Refill: 2    5. Acute left-sided low back pain with left-sided sciatica        BMI is >= 25 and <30. (Overweight) The following options were offered after discussion;: exercise counseling/recommendations and nutrition counseling/recommendations       Expected course, medications, and adverse effects discussed.  Call or return if worsening or persistent symptoms.  I wore protective equipment throughout this patient encounter including a mask, gloves, and eye protection.  Hand hygiene was performed before donning protective equipment and after removal when leaving the room. The complete contents of the  Assessment and Plan and Data/Lab Results as documented above have been reviewed and addressed by myself with the patient today as part of an ongoing evaluation / treatment plan.  If some of the documentation has been copied from a previous note and is unchanged it indicates that this problem / plan has been assessed today but is stable from a previous visit and no changes have been recommended.

## 2024-07-30 ENCOUNTER — OFFICE VISIT (OUTPATIENT)
Dept: FAMILY MEDICINE CLINIC | Facility: CLINIC | Age: 64
End: 2024-07-30
Payer: COMMERCIAL

## 2024-07-30 VITALS
WEIGHT: 170 LBS | TEMPERATURE: 98.4 F | SYSTOLIC BLOOD PRESSURE: 124 MMHG | HEIGHT: 62 IN | HEART RATE: 118 BPM | DIASTOLIC BLOOD PRESSURE: 90 MMHG | RESPIRATION RATE: 18 BRPM | OXYGEN SATURATION: 96 % | BODY MASS INDEX: 31.28 KG/M2

## 2024-07-30 DIAGNOSIS — M79.605 PAIN OF LEFT LOWER EXTREMITY: Primary | ICD-10-CM

## 2024-07-30 DIAGNOSIS — M54.42 ACUTE LEFT-SIDED LOW BACK PAIN WITH LEFT-SIDED SCIATICA: ICD-10-CM

## 2024-07-30 DIAGNOSIS — M62.838 MUSCLE SPASM: ICD-10-CM

## 2024-07-30 DIAGNOSIS — E66.09 CLASS 1 OBESITY DUE TO EXCESS CALORIES WITH SERIOUS COMORBIDITY AND BODY MASS INDEX (BMI) OF 31.0 TO 31.9 IN ADULT: ICD-10-CM

## 2024-07-30 DIAGNOSIS — F17.200 TOBACCO USE DISORDER: ICD-10-CM

## 2024-07-30 PROCEDURE — 99213 OFFICE O/P EST LOW 20 MIN: CPT | Performed by: FAMILY MEDICINE

## 2024-07-30 RX ORDER — CYCLOBENZAPRINE HCL 10 MG
5-10 TABLET ORAL NIGHTLY PRN
Qty: 30 TABLET | Refills: 2 | Status: SHIPPED | OUTPATIENT
Start: 2024-07-30

## 2024-08-16 PROBLEM — M54.42 ACUTE LEFT-SIDED LOW BACK PAIN WITH LEFT-SIDED SCIATICA: Status: ACTIVE | Noted: 2024-08-16

## 2024-09-20 NOTE — PROGRESS NOTES
Subjective   Tara Cisse is a 63 y.o. female.     Chief Complaint   Patient presents with    Skin Lesion       History of Present Illness  Influenza immunization was not given due to allergy     Tara presents to the office today with concerns of a skin lesion by right eye. She reports this had appeared about 6 months ago and its worsening. She also reports lesions on both legs that appeared and have been changing in characteristics. She reports they start to itch and flake but denies changes in size or color. She reports extensive sun exposure as a young adult.            I personally reviewed and updated the patient's allergies, medications, problem list, and past medical, surgical, social, and family history. I have reviewed and confirmed the accuracy of the History of Present Illness and Review of Symptoms as documented by the MA/LPN/RN. Andrea Ennis MD    Family History   Problem Relation Age of Onset    Hyperlipidemia Mother     Diabetes Father     Heart failure Father     Hyperlipidemia Father     Heart failure Paternal Grandmother     Breast cancer Neg Hx     Ovarian cancer Neg Hx        Social History     Tobacco Use    Smoking status: Every Day     Current packs/day: 1.00     Average packs/day: 1 pack/day for 44.7 years (44.7 ttl pk-yrs)     Types: Cigarettes     Start date: 1/1/1980     Passive exposure: Never    Smokeless tobacco: Never   Vaping Use    Vaping status: Former    Start date: 4/13/2018    Quit date: 5/15/2018    Substances: Nicotine    Devices: Disposable   Substance Use Topics    Alcohol use: No    Drug use: Never     Comment: gabapentin for knee prn       Past Surgical History:   Procedure Laterality Date    ESSURE TUBAL LIGATION Bilateral     TOTAL ABDOMINAL HYSTERECTOMY      ovaries remain, benign reasons, bleeding       Patient Active Problem List   Diagnosis    Anemia due to vitamin B12 deficiency    Vitamin B 12 deficiency    Depressive disorder    Gastroesophageal reflux  disease without esophagitis    Mixed hyperlipidemia    Osteoarthritis of right knee    Simple chronic bronchitis    Tobacco use disorder    Annual visit for general adult medical examination with abnormal findings    Encounter for screening for malignant neoplasm of breast    Dense breast    Menopausal syndrome    Colon cancer screening    Cervical cancer screening    Class 1 obesity due to excess calories with serious comorbidity and body mass index (BMI) of 31.0 to 31.9 in adult    Costochondral chest pain    Other fatigue    Nondisplaced fracture of lateral malleolus of left fibula, initial encounter for closed fracture    Acute pain of left knee    H/O: hysterectomy    Lung cancer screening declined by patient    Chronic nonseasonal allergic rhinitis due to pollen    Leg swelling    Hypertension    Tendinitis of left rotator cuff    Acute bacterial bronchitis    Acute left-sided low back pain with left-sided sciatica    Skin tag         Current Outpatient Medications:     albuterol sulfate  (90 Base) MCG/ACT inhaler, INHALE 2 PUFFS BY MOUTH EVERY 4 HOURS AS NEEDED FOR WHEEZING, Disp: 8.5 g, Rfl: 0    aspirin 81 MG chewable tablet, Chew 1 tablet Daily., Disp: , Rfl:     cyclobenzaprine (FLEXERIL) 10 MG tablet, Take 0.5-1 tablets by mouth At Night As Needed for Muscle Spasms., Disp: 30 tablet, Rfl: 2    Loratadine (Claritin) 10 MG chewable tablet, Chew., Disp: , Rfl:     omeprazole (priLOSEC) 40 MG capsule, TAKE 1 CAPSULE BY MOUTH DAILY, Disp: 30 capsule, Rfl: 12    rosuvastatin (CRESTOR) 20 MG tablet, Take 1 tablet by mouth Daily., Disp: 30 tablet, Rfl: 12    vitamin B-12 (CYANOCOBALAMIN) 500 MCG tablet, Take 1 tablet by mouth Daily. Vitamin B12 deficiency, Disp: , Rfl:     clindamycin (CLEOCIN) 300 MG capsule, Take 1 capsule by mouth 3 (Three) Times a Day., Disp: 45 capsule, Rfl: 0         Review of Systems   Constitutional:  Negative for chills and diaphoresis.   Eyes:  Negative for visual disturbance.  "  Respiratory:  Negative for shortness of breath.    Cardiovascular:  Negative for chest pain and palpitations.   Gastrointestinal:  Negative for abdominal pain and nausea.   Endocrine: Negative for polydipsia and polyphagia.   Musculoskeletal:  Negative for neck stiffness.   Skin:  Negative for color change and pallor.   Neurological:  Negative for seizures and syncope.   Hematological:  Negative for adenopathy.   Psychiatric/Behavioral:  Negative for hallucinations and suicidal ideas.        I have reviewed and confirmed the accuracy of the ROS as documented by the MA/LPN/RN Andrea Ennis MD      Objective   /80 (BP Location: Right arm, Patient Position: Sitting, Cuff Size: Large Adult)   Pulse 108   Temp 98.4 °F (36.9 °C)   Resp 18   Ht 157.5 cm (62\")   Wt 74.8 kg (165 lb)   LMP  (LMP Unknown)   SpO2 96%   BMI 30.18 kg/m²   BP Readings from Last 3 Encounters:   09/24/24 118/80   07/30/24 124/90   11/27/23 132/92     Wt Readings from Last 3 Encounters:   09/24/24 74.8 kg (165 lb)   07/30/24 77.1 kg (170 lb)   12/08/23 71.4 kg (157 lb 8 oz)     Physical Exam  Constitutional:       Appearance: Normal appearance. She is well-developed. She is not diaphoretic.   Eyes:      General: Lids are normal. No scleral icterus.        Right eye: No foreign body or discharge.         Left eye: No foreign body or discharge.      Extraocular Movements:      Right eye: No nystagmus.      Left eye: No nystagmus.      Conjunctiva/sclera: Conjunctivae normal.      Right eye: Right conjunctiva is not injected. No exudate or hemorrhage.     Left eye: Left conjunctiva is not injected. No exudate or hemorrhage.     Pupils: Pupils are equal, round, and reactive to light.      Funduscopic exam:     Right eye: No hemorrhage, exudate, AV nicking, arteriolar narrowing or papilledema.         Left eye: No hemorrhage, exudate, AV nicking, arteriolar narrowing or papilledema.   Cardiovascular:      Rate and Rhythm: Normal rate and " "regular rhythm.      Pulses: Normal pulses.      Heart sounds: Normal heart sounds, S1 normal and S2 normal. No murmur heard.     No friction rub. No gallop.   Pulmonary:      Effort: Pulmonary effort is normal. No accessory muscle usage.      Breath sounds: Normal breath sounds. No stridor. No decreased breath sounds, wheezing, rhonchi or rales.   Abdominal:      General: Bowel sounds are normal. There is no distension.      Palpations: Abdomen is soft. Abdomen is not rigid. There is no mass or pulsatile mass.      Tenderness: There is no abdominal tenderness. There is no guarding or rebound. Negative signs include Argueta's sign.      Hernia: No hernia is present.   Skin:     General: Skin is warm and dry.      Coloration: Skin is not pale.      Comments: Skin tag eye, benign appearance skin tag I   Neurological:      Mental Status: She is alert and oriented to person, place, and time.      Coordination: Coordination normal.      Gait: Gait normal.         Data / Lab Results:    No results found for: \"HGBA1C\"     Lab Results   Component Value Date     (H) 11/20/2023     (H) 06/21/2022     (H) 06/14/2021     Lab Results   Component Value Date    CHOL 194 06/03/2019    CHOL 196 02/13/2019    CHOL 205 (H) 11/19/2018     Lab Results   Component Value Date    TRIG 154 (H) 11/20/2023    TRIG 202 (H) 06/21/2022    TRIG 147 06/14/2021     Lab Results   Component Value Date    HDL 45 11/20/2023    HDL 42 06/21/2022    HDL 44 06/14/2021     No results found for: \"PSA\"  Lab Results   Component Value Date    WBC 8.1 09/24/2024    HGB 14.3 09/24/2024    HCT 43.7 09/24/2024    MCV 92 09/24/2024     09/24/2024     Lab Results   Component Value Date    TSH 0.521 11/20/2023    L2VDJHM 1.19 08/04/2016    THYROIDAB <9 12/17/2020      Lab Results   Component Value Date    GLUCOSE 94 09/24/2024    BUN 13 09/24/2024    CREATININE 0.70 09/24/2024    EGFRIFNONA 72 06/14/2021    EGFRIFAFRI 83 06/14/2021    BCR " "19 09/24/2024    K 4.7 09/24/2024    CO2 23 09/24/2024    CALCIUM 9.4 09/24/2024    PROTENTOTREF 6.7 09/24/2024    ALBUMIN 4.2 09/24/2024    LABIL2 1.9 11/20/2023    AST 11 09/24/2024    ALT 9 09/24/2024     No results found for: \"MAKSIM\", \"RF\", \"SEDRATE\"   No results found for: \"CRP\"   No results found for: \"IRON\", \"TIBC\", \"FERRITIN\"   Lab Results   Component Value Date    [id number] 820 11/20/2023          Assessment & Plan      Medications        Problem List         LOS    Diverticulitis.  Likely diagnosis, improved today, not tolerating Cipro/Flagyl, allergic to Augmentin, start Levaquin/clindamycin.  Check blood work.  Follow-up recheck.  Increase fluids/fiber.  Plan follow-up colonoscopy.  Skin tag eye.  Symptomatic.  Referral to Dr. Dasia Montiel scheduled.  Seborrheic keratoses.  Multiple legs, symptomatic, frozen today.  Topical care discussed.  Expected course discussed.  Consider repeat freezing, resection if worsening or persistent symptoms.  Health maintenance.  Doing well, vaccines current.  COVID-vaccine declined.  Discussed coated baby aspirin daily.  Discussed health maintenance, screening test, lifestyle modification.  Mammogram benign 8/22, recheck.  Acute bacterial bronchitis.  Persistent today, repeat course antibiotics.  Does not tolerate prednisone.  Amari or refutrn if fever or worsening symptoms.  Knee pain.    Much improved today status post knee injection.  Chronic/recurrent, likely secondary to OA.  X-ray benign 2019.  Has had orthopedics eval, good response to steroid injection, repeated 4/21..  Doing well on gabapentin, taking Rx infrequently..  Ice, rehabilitation exercises consider orthopedics follow-up if persistent symptoms.  Claudication.  Clinically improved today, VINNIE normal.  Stop smoking.  Allergic rhinitis.  History of elevated blood pressure with Claritin-D, okay to reattempt plain Claritin.  Start nasal steroids. Call return persistent symptoms.  Tobacco use.  Encourage cessation.  " Has cut back.  Recommend DEXA scan she states she will consider , low-dose CT scan lung cancer screening scheduled.  Recommend carotid Doppler she states she will consider.    Hyperlipidemia.  Persistent elevation on  increased dose simvastatin, change to rosuvastatin.    Persistent elevation LDL, discussed diet, exercise, lifestyle modification, recheck 1 year.  Consider change to atorvastatin if persistent elevation.  Cardiac stress testing benign 2019 (treadmill cardiolyte).  Colon cancer screening: Recommend colonoscopy she agrees this year, [facility] referral scheduled.  Actinic keratosis.  Right hand.  Imporved / resolved today s/p freezing. Topical care discussed.  Consider repeat freezing resection if persistent symptoms.  Rotator cuff tendinitis.  Strain.  Overall benign exam today with some left trapezius spasm.  Ice, LSATs, rehabilitation exercise discussed.  Start nightly muscle relaxant.  Consider joint injection if persistent symptoms.  Low back pain.  Overall benign exam today, left lower extremity radiculopathy, likely secondary lumbar disc disease.  Intermittent symptoms, infrequent episodes currently.  Continue NSAIDs, she does not tolerate prednisone.  Start as needed nightly muscle relaxant, rehabilitation exercise discussed.  PT referral declined.  Follow-up recheck.  Consider imaging if worsening symptoms.        Diagnoses and all orders for this visit:    1. Skin lesion (Primary)    2. Tobacco use disorder    3. Class 1 obesity due to excess calories with serious comorbidity and body mass index (BMI) of 31.0 to 31.9 in adult    4. Skin tag  -     Ambulatory Referral to ENT (Otolaryngology)    5. Diverticulitis  -     Discontinue: levoFLOXacin (LEVAQUIN) 750 MG tablet; Take 1 tablet by mouth Daily.  Dispense: 15 tablet; Refill: 0  -     clindamycin (CLEOCIN) 300 MG capsule; Take 1 capsule by mouth 3 (Three) Times a Day.  Dispense: 45 capsule; Refill: 0  -     CBC & Differential  -     Comprehensive  Metabolic Panel  -     Lipase        BMI is >= 30 and <35. (Class 1 Obesity). The following options were offered after discussion;: exercise counseling/recommendations and nutrition counseling/recommendations        Expected course, medications, and adverse effects discussed.  Call or return if worsening or persistent symptoms.  I wore protective equipment throughout this patient encounter including a mask, gloves, and eye protection.  Hand hygiene was performed before donning protective equipment and after removal when leaving the room. The complete contents of the Assessment and Plan and Data/Lab Results as documented above have been reviewed and addressed by myself with the patient today as part of an ongoing evaluation / treatment plan.  If some of the documentation has been copied from a previous note and is unchanged it indicates that this problem / plan has been assessed today but is stable from a previous visit and no changes have been recommended.

## 2024-09-24 ENCOUNTER — OFFICE VISIT (OUTPATIENT)
Dept: FAMILY MEDICINE CLINIC | Facility: CLINIC | Age: 64
End: 2024-09-24
Payer: COMMERCIAL

## 2024-09-24 VITALS
OXYGEN SATURATION: 96 % | DIASTOLIC BLOOD PRESSURE: 80 MMHG | HEIGHT: 62 IN | WEIGHT: 165 LBS | SYSTOLIC BLOOD PRESSURE: 118 MMHG | HEART RATE: 108 BPM | TEMPERATURE: 98.4 F | RESPIRATION RATE: 18 BRPM | BODY MASS INDEX: 30.36 KG/M2

## 2024-09-24 DIAGNOSIS — F17.200 TOBACCO USE DISORDER: ICD-10-CM

## 2024-09-24 DIAGNOSIS — K57.92 DIVERTICULITIS: ICD-10-CM

## 2024-09-24 DIAGNOSIS — E66.811 CLASS 1 OBESITY DUE TO EXCESS CALORIES WITH SERIOUS COMORBIDITY AND BODY MASS INDEX (BMI) OF 31.0 TO 31.9 IN ADULT: ICD-10-CM

## 2024-09-24 DIAGNOSIS — L98.9 SKIN LESION: Primary | ICD-10-CM

## 2024-09-24 DIAGNOSIS — L91.8 SKIN TAG: ICD-10-CM

## 2024-09-24 DIAGNOSIS — E66.09 CLASS 1 OBESITY DUE TO EXCESS CALORIES WITH SERIOUS COMORBIDITY AND BODY MASS INDEX (BMI) OF 31.0 TO 31.9 IN ADULT: ICD-10-CM

## 2024-09-24 PROCEDURE — 99213 OFFICE O/P EST LOW 20 MIN: CPT | Performed by: FAMILY MEDICINE

## 2024-09-24 RX ORDER — LEVOFLOXACIN 750 MG/1
750 TABLET, FILM COATED ORAL DAILY
Qty: 15 TABLET | Refills: 0 | Status: SHIPPED | OUTPATIENT
Start: 2024-09-24 | End: 2024-09-27

## 2024-09-24 RX ORDER — CLINDAMYCIN HCL 300 MG
300 CAPSULE ORAL 3 TIMES DAILY
Qty: 45 CAPSULE | Refills: 0 | Status: SHIPPED | OUTPATIENT
Start: 2024-09-24

## 2024-09-25 LAB
ALBUMIN SERPL-MCNC: 4.2 G/DL (ref 3.9–4.9)
ALP SERPL-CCNC: 91 IU/L (ref 44–121)
ALT SERPL-CCNC: 9 IU/L (ref 0–32)
AST SERPL-CCNC: 11 IU/L (ref 0–40)
BASOPHILS # BLD AUTO: 0 X10E3/UL (ref 0–0.2)
BASOPHILS NFR BLD AUTO: 0 %
BILIRUB SERPL-MCNC: 0.5 MG/DL (ref 0–1.2)
BUN SERPL-MCNC: 13 MG/DL (ref 8–27)
BUN/CREAT SERPL: 19 (ref 12–28)
CALCIUM SERPL-MCNC: 9.4 MG/DL (ref 8.7–10.3)
CHLORIDE SERPL-SCNC: 106 MMOL/L (ref 96–106)
CO2 SERPL-SCNC: 23 MMOL/L (ref 20–29)
CREAT SERPL-MCNC: 0.7 MG/DL (ref 0.57–1)
EGFRCR SERPLBLD CKD-EPI 2021: 98 ML/MIN/1.73
EOSINOPHIL # BLD AUTO: 0.1 X10E3/UL (ref 0–0.4)
EOSINOPHIL NFR BLD AUTO: 1 %
ERYTHROCYTE [DISTWIDTH] IN BLOOD BY AUTOMATED COUNT: 12.9 % (ref 11.7–15.4)
GLOBULIN SER CALC-MCNC: 2.5 G/DL (ref 1.5–4.5)
GLUCOSE SERPL-MCNC: 94 MG/DL (ref 70–99)
HCT VFR BLD AUTO: 43.7 % (ref 34–46.6)
HGB BLD-MCNC: 14.3 G/DL (ref 11.1–15.9)
IMM GRANULOCYTES # BLD AUTO: 0 X10E3/UL (ref 0–0.1)
IMM GRANULOCYTES NFR BLD AUTO: 0 %
LIPASE SERPL-CCNC: 18 U/L (ref 14–72)
LYMPHOCYTES # BLD AUTO: 2.2 X10E3/UL (ref 0.7–3.1)
LYMPHOCYTES NFR BLD AUTO: 27 %
MCH RBC QN AUTO: 30 PG (ref 26.6–33)
MCHC RBC AUTO-ENTMCNC: 32.7 G/DL (ref 31.5–35.7)
MCV RBC AUTO: 92 FL (ref 79–97)
MONOCYTES # BLD AUTO: 0.5 X10E3/UL (ref 0.1–0.9)
MONOCYTES NFR BLD AUTO: 6 %
NEUTROPHILS # BLD AUTO: 5.4 X10E3/UL (ref 1.4–7)
NEUTROPHILS NFR BLD AUTO: 66 %
PLATELET # BLD AUTO: 251 X10E3/UL (ref 150–450)
POTASSIUM SERPL-SCNC: 4.7 MMOL/L (ref 3.5–5.2)
PROT SERPL-MCNC: 6.7 G/DL (ref 6–8.5)
RBC # BLD AUTO: 4.77 X10E6/UL (ref 3.77–5.28)
SODIUM SERPL-SCNC: 142 MMOL/L (ref 134–144)
WBC # BLD AUTO: 8.1 X10E3/UL (ref 3.4–10.8)

## 2024-09-27 ENCOUNTER — TELEPHONE (OUTPATIENT)
Dept: FAMILY MEDICINE CLINIC | Facility: CLINIC | Age: 64
End: 2024-09-27
Payer: COMMERCIAL

## 2024-10-01 PROBLEM — L91.8 SKIN TAG: Status: ACTIVE | Noted: 2024-10-01

## 2024-10-10 ENCOUNTER — TELEPHONE (OUTPATIENT)
Dept: FAMILY MEDICINE CLINIC | Facility: CLINIC | Age: 64
End: 2024-10-10
Payer: COMMERCIAL

## 2024-10-11 NOTE — PROGRESS NOTES
Subjective   Tara Cisse is a 63 y.o. female.     Chief Complaint   Patient presents with    Abdominal Pain       History of Present Illness  Followup on lesions on legs. Left inner thigh, right outer thigh. Froze on 9/24/24. Tara denies any drainage or pain to the sites.  Abdominal Pain  This is a recurrent problem. The current episode started more than 1 month ago. The problem occurs rarely. The problem has been resolved. The pain is at a severity of 0/10. The patient is experiencing no pain. Pertinent negatives include no nausea.            I personally reviewed and updated the patient's allergies, medications, problem list, and past medical, surgical, social, and family history. I have reviewed and confirmed the accuracy of the History of Present Illness and Review of Symptoms as documented by the MA/LPN/RN. Alma Denney MA    Family History   Problem Relation Age of Onset    Hyperlipidemia Mother     Diabetes Father     Heart failure Father     Hyperlipidemia Father     Heart failure Paternal Grandmother     Breast cancer Neg Hx     Ovarian cancer Neg Hx        Social History     Tobacco Use    Smoking status: Every Day     Current packs/day: 1.00     Average packs/day: 1 pack/day for 44.8 years (44.8 ttl pk-yrs)     Types: Cigarettes     Start date: 1/1/1980     Passive exposure: Never    Smokeless tobacco: Never   Vaping Use    Vaping status: Former    Start date: 4/13/2018    Quit date: 5/15/2018    Substances: Nicotine    Devices: Disposable   Substance Use Topics    Alcohol use: No    Drug use: Never     Comment: gabapentin for knee prn       Past Surgical History:   Procedure Laterality Date    ESSURE TUBAL LIGATION Bilateral     TOTAL ABDOMINAL HYSTERECTOMY      ovaries remain, benign reasons, bleeding       Patient Active Problem List   Diagnosis    Anemia due to vitamin B12 deficiency    Vitamin B 12 deficiency    Depressive disorder    Gastroesophageal reflux disease without esophagitis     Mixed hyperlipidemia    Osteoarthritis of right knee    Simple chronic bronchitis    Tobacco use disorder    Annual visit for general adult medical examination with abnormal findings    Encounter for screening for malignant neoplasm of breast    Dense breast    Menopausal syndrome    Colon cancer screening    Cervical cancer screening    Class 1 obesity due to excess calories with serious comorbidity and body mass index (BMI) of 31.0 to 31.9 in adult    Costochondral chest pain    Other fatigue    Nondisplaced fracture of lateral malleolus of left fibula, initial encounter for closed fracture    Acute pain of left knee    H/O: hysterectomy    Lung cancer screening declined by patient    Chronic nonseasonal allergic rhinitis due to pollen    Leg swelling    Hypertension    Tendinitis of left rotator cuff    Acute bacterial bronchitis    Acute left-sided low back pain with left-sided sciatica    Skin tag         Current Outpatient Medications:     albuterol sulfate  (90 Base) MCG/ACT inhaler, INHALE 2 PUFFS BY MOUTH EVERY 4 HOURS AS NEEDED FOR WHEEZING, Disp: 8.5 g, Rfl: 0    aspirin 81 MG chewable tablet, Chew 1 tablet Daily., Disp: , Rfl:     cyclobenzaprine (FLEXERIL) 10 MG tablet, Take 0.5-1 tablets by mouth At Night As Needed for Muscle Spasms., Disp: 30 tablet, Rfl: 2    Loratadine (Claritin) 10 MG chewable tablet, Chew., Disp: , Rfl:     omeprazole (priLOSEC) 40 MG capsule, TAKE 1 CAPSULE BY MOUTH DAILY, Disp: 30 capsule, Rfl: 12    rosuvastatin (CRESTOR) 20 MG tablet, Take 1 tablet by mouth Daily., Disp: 30 tablet, Rfl: 12    vitamin B-12 (CYANOCOBALAMIN) 500 MCG tablet, Take 1 tablet by mouth Daily. Vitamin B12 deficiency, Disp: , Rfl:     clindamycin (CLEOCIN) 300 MG capsule, Take 1 capsule by mouth 3 (Three) Times a Day. (Patient not taking: Reported on 10/14/2024), Disp: 45 capsule, Rfl: 0         Review of Systems   Constitutional:  Negative for chills and diaphoresis.   Eyes:  Negative for visual  "disturbance.   Respiratory:  Negative for shortness of breath.    Cardiovascular:  Negative for chest pain and palpitations.   Gastrointestinal:  Positive for abdominal pain. Negative for nausea.   Endocrine: Negative for polydipsia and polyphagia.   Musculoskeletal:  Negative for neck stiffness.   Skin:  Negative for color change and pallor.   Neurological:  Negative for seizures and syncope.   Hematological:  Negative for adenopathy.       I have reviewed and confirmed the accuracy of the ROS as documented by the MA/LPN/RN Alma Denney MA      Objective   Pulse 86   Temp 95.1 °F (35.1 °C)   Resp 18   Ht 157.5 cm (62\")   Wt 73.9 kg (163 lb)   LMP  (LMP Unknown)   SpO2 99%   BMI 29.81 kg/m²   BP Readings from Last 3 Encounters:   09/24/24 118/80   07/30/24 124/90   11/27/23 132/92     Wt Readings from Last 3 Encounters:   10/14/24 73.9 kg (163 lb)   09/24/24 74.8 kg (165 lb)   07/30/24 77.1 kg (170 lb)     Physical Exam  Constitutional:       Appearance: Normal appearance. She is well-developed. She is not diaphoretic.   Cardiovascular:      Rate and Rhythm: Normal rate and regular rhythm.      Pulses: Normal pulses.      Heart sounds: Normal heart sounds, S1 normal and S2 normal. No murmur heard.     No friction rub. No gallop.   Pulmonary:      Effort: Pulmonary effort is normal. No accessory muscle usage.      Breath sounds: Normal breath sounds. No stridor. No decreased breath sounds, wheezing, rhonchi or rales.   Abdominal:      General: Bowel sounds are normal. There is no distension.      Palpations: Abdomen is soft. Abdomen is not rigid. There is no mass or pulsatile mass.      Tenderness: There is no abdominal tenderness. There is no guarding or rebound. Negative signs include Argueta's sign.      Hernia: No hernia is present.   Skin:     General: Skin is warm and dry.      Coloration: Skin is not pale.   Neurological:      Mental Status: She is alert and oriented to person, place, and time.     " " Coordination: Coordination normal.      Gait: Gait normal.         Data / Lab Results:    No results found for: \"HGBA1C\"     Lab Results   Component Value Date     (H) 11/20/2023     (H) 06/21/2022     (H) 06/14/2021     Lab Results   Component Value Date    CHOL 194 06/03/2019    CHOL 196 02/13/2019    CHOL 205 (H) 11/19/2018     Lab Results   Component Value Date    TRIG 154 (H) 11/20/2023    TRIG 202 (H) 06/21/2022    TRIG 147 06/14/2021     Lab Results   Component Value Date    HDL 45 11/20/2023    HDL 42 06/21/2022    HDL 44 06/14/2021     No results found for: \"PSA\"  Lab Results   Component Value Date    WBC 8.1 09/24/2024    HGB 14.3 09/24/2024    HCT 43.7 09/24/2024    MCV 92 09/24/2024     09/24/2024     Lab Results   Component Value Date    TSH 0.521 11/20/2023    [id number] 1.19 08/04/2016    THYROIDAB <9 12/17/2020      Lab Results   Component Value Date    GLUCOSE 94 09/24/2024    BUN 13 09/24/2024    CREATININE 0.70 09/24/2024    EGFRIFNONA 72 06/14/2021    EGFRIFAFRI 83 06/14/2021    BCR 19 09/24/2024    K 4.7 09/24/2024    CO2 23 09/24/2024    CALCIUM 9.4 09/24/2024    PROTENTOTREF 6.7 09/24/2024    ALBUMIN 4.2 09/24/2024    LABIL2 1.9 11/20/2023    AST 11 09/24/2024    ALT 9 09/24/2024     No results found for: \"MAKSIM\", \"RF\", \"SEDRATE\"   No results found for: \"CRP\"   No results found for: \"IRON\", \"TIBC\", \"FERRITIN\"   Lab Results   Component Value Date    [id number] 820 11/20/2023          Assessment & Plan      Medications        Problem List         LOS    Diverticulitis.  Improved/resolved, benign exam today.  Has completed course clindamycin.  White blood cell count normal.  Increase fiber intake/add Metamucil.  Has colonoscopy upcoming.  Call return if fever or recurrent symptoms..  Skin tag eye.  Symptomatic.  Referral to Dr. Dasia Montiel scheduled.  Seborrheic keratoses.  Improved/resolving today status post freezing.  Multiple legs, symptomatic, frozen today.  Topical " care discussed.  Expected course discussed.  Consider repeat freezing, resection if worsening or persistent symptoms.  Health maintenance.  Doing well, vaccines current.  COVID-vaccine declined.  Discussed coated baby aspirin daily.  Discussed health maintenance, screening test, lifestyle modification.  Mammogram benign 8/22, recheck.  Acute bacterial bronchitis.  Persistent today, repeat course antibiotics.  Does not tolerate prednisone.  Amari or refutrn if fever or worsening symptoms.  Knee pain.    Much improved today status post knee injection.  Chronic/recurrent, likely secondary to OA.  X-ray benign 2019.  Has had orthopedics eval, good response to steroid injection, repeated 4/21..  Doing well on gabapentin, taking Rx infrequently..  Ice, rehabilitation exercises consider orthopedics follow-up if persistent symptoms.  Claudication.  Clinically improved today, VINNIE normal.  Stop smoking.  Allergic rhinitis.  History of elevated blood pressure with Claritin-D, okay to reattempt plain Claritin.  Start nasal steroids. Call return persistent symptoms.  Tobacco use.  Encourage cessation.  Has cut back.  Recommend DEXA scan she states she will consider , low-dose CT scan lung cancer screening scheduled.  Recommend carotid Doppler she states she will consider.    Hyperlipidemia.  Persistent elevation on  increased dose simvastatin, change to rosuvastatin.    Persistent elevation LDL, discussed diet, exercise, lifestyle modification, recheck 1 year.  Consider change to atorvastatin if persistent elevation.  Cardiac stress testing benign 2019 (treadmill cardiolyte).  Colon cancer screening: Recommend colonoscopy she agrees this year, I referral scheduled.  Actinic keratosis.  Right hand.  Imporved / resolved today s/p freezing. Topical care discussed.  Consider repeat freezing resection if persistent symptoms.  Rotator cuff tendinitis.  Strain.  Overall benign exam today with some left trapezius spasm.  Ice, LSATs,  rehabilitation exercise discussed.  Start nightly muscle relaxant.  Consider joint injection if persistent symptoms.  Low back pain.  Overall benign exam today, left lower extremity radiculopathy, likely secondary lumbar disc disease.  Intermittent symptoms, infrequent episodes currently.  Continue NSAIDs, she does not tolerate prednisone.  Start as needed nightly muscle relaxant, rehabilitation exercise discussed.  PT referral declined.  Follow-up recheck.  Consider imaging if worsening symptoms.      Diagnoses and all orders for this visit:    1. Diverticulitis (Primary)    2. Class 1 obesity due to excess calories with serious comorbidity and body mass index (BMI) of 31.0 to 31.9 in adult    3. Tobacco use disorder                Expected course, medications, and adverse effects discussed.  Call or return if worsening or persistent symptoms.  I wore protective equipment throughout this patient encounter including a mask, gloves, and eye protection.  Hand hygiene was performed before donning protective equipment and after removal when leaving the room. The complete contents of the Assessment and Plan and Data/Lab Results as documented above have been reviewed and addressed by myself with the patient today as part of an ongoing evaluation / treatment plan.  If some of the documentation has been copied from a previous note and is unchanged it indicates that this problem / plan has been assessed today but is stable from a previous visit and no changes have been recommended.

## 2024-10-14 ENCOUNTER — OFFICE VISIT (OUTPATIENT)
Dept: FAMILY MEDICINE CLINIC | Facility: CLINIC | Age: 64
End: 2024-10-14
Payer: COMMERCIAL

## 2024-10-14 VITALS
WEIGHT: 163 LBS | TEMPERATURE: 95.1 F | OXYGEN SATURATION: 99 % | HEIGHT: 62 IN | BODY MASS INDEX: 30 KG/M2 | RESPIRATION RATE: 18 BRPM | SYSTOLIC BLOOD PRESSURE: 112 MMHG | DIASTOLIC BLOOD PRESSURE: 82 MMHG | HEART RATE: 86 BPM

## 2024-10-14 DIAGNOSIS — E66.09 CLASS 1 OBESITY DUE TO EXCESS CALORIES WITH SERIOUS COMORBIDITY AND BODY MASS INDEX (BMI) OF 31.0 TO 31.9 IN ADULT: ICD-10-CM

## 2024-10-14 DIAGNOSIS — F17.200 TOBACCO USE DISORDER: ICD-10-CM

## 2024-10-14 DIAGNOSIS — E66.811 CLASS 1 OBESITY DUE TO EXCESS CALORIES WITH SERIOUS COMORBIDITY AND BODY MASS INDEX (BMI) OF 31.0 TO 31.9 IN ADULT: ICD-10-CM

## 2024-10-14 DIAGNOSIS — K57.92 DIVERTICULITIS: Primary | ICD-10-CM

## 2024-10-14 PROCEDURE — 99213 OFFICE O/P EST LOW 20 MIN: CPT | Performed by: FAMILY MEDICINE

## 2024-10-21 ENCOUNTER — TELEPHONE (OUTPATIENT)
Dept: FAMILY MEDICINE CLINIC | Facility: CLINIC | Age: 64
End: 2024-10-21
Payer: COMMERCIAL

## 2024-10-21 NOTE — TELEPHONE ENCOUNTER
Flakita sent the following message via [employer] please advise       10/21/24 11:05 AM  Rhina Ennis I am pretty sure I have bronchitis  have a cough  was wondering if you could send something in or do a phone call with me thank you very  much i know  getting an appointment takes awhile I know your busy

## 2024-10-22 ENCOUNTER — OFFICE VISIT (OUTPATIENT)
Dept: FAMILY MEDICINE CLINIC | Facility: CLINIC | Age: 64
End: 2024-10-22
Payer: COMMERCIAL

## 2024-10-22 VITALS — WEIGHT: 163 LBS | BODY MASS INDEX: 30 KG/M2 | HEIGHT: 62 IN

## 2024-10-22 DIAGNOSIS — B96.89 ACUTE BACTERIAL BRONCHITIS: ICD-10-CM

## 2024-10-22 DIAGNOSIS — F17.200 TOBACCO USE DISORDER: ICD-10-CM

## 2024-10-22 DIAGNOSIS — R05.1 ACUTE COUGH: Primary | ICD-10-CM

## 2024-10-22 DIAGNOSIS — J20.8 ACUTE BACTERIAL BRONCHITIS: ICD-10-CM

## 2024-10-22 DIAGNOSIS — R05.1 ACUTE COUGH: ICD-10-CM

## 2024-10-22 PROCEDURE — 99213 OFFICE O/P EST LOW 20 MIN: CPT | Performed by: FAMILY MEDICINE

## 2024-10-22 RX ORDER — CEPHALEXIN 500 MG/1
500 CAPSULE ORAL 3 TIMES DAILY
Qty: 30 CAPSULE | Refills: 0 | Status: SHIPPED | OUTPATIENT
Start: 2024-10-22

## 2024-10-22 NOTE — PROGRESS NOTES
Subjective   Tara Cisse is a 63 y.o. female.     Chief Complaint   Patient presents with    Cough       Cough  This is a new problem. The current episode started yesterday. The problem has been unchanged. The problem occurs intermittent. The cough is Productive of clear sputum. Associated symptoms include nasal congestion and postnasal drip. Pertinent negatives include no chest pain, chills, ear congestion, ear pain, fever, headaches, sore throat or shortness of breath. The symptoms are aggravated by lying down. She has tried nothing for the symptoms.            I personally reviewed and updated the patient's allergies, medications, problem list, and past medical, surgical, social, and family history. I have reviewed and confirmed the accuracy of the History of Present Illness and Review of Symptoms as documented by the MA/LPN/RN. Andrea Ennis MD    Family History   Problem Relation Age of Onset    Hyperlipidemia Mother     Diabetes Father     Heart failure Father     Hyperlipidemia Father     Heart failure Paternal Grandmother     Breast cancer Neg Hx     Ovarian cancer Neg Hx        Social History     Tobacco Use    Smoking status: Every Day     Current packs/day: 1.00     Average packs/day: 1 pack/day for 44.8 years (44.8 ttl pk-yrs)     Types: Cigarettes     Start date: 1/1/1980     Passive exposure: Never    Smokeless tobacco: Never   Vaping Use    Vaping status: Former    Start date: 4/13/2018    Quit date: 5/15/2018    Substances: Nicotine    Devices: Disposable   Substance Use Topics    Alcohol use: No    Drug use: Never     Comment: gabapentin for knee prn       Past Surgical History:   Procedure Laterality Date    ESSURE TUBAL LIGATION Bilateral     TOTAL ABDOMINAL HYSTERECTOMY      ovaries remain, benign reasons, bleeding       Patient Active Problem List   Diagnosis    Anemia due to vitamin B12 deficiency    Vitamin B 12 deficiency    Depressive disorder    Gastroesophageal reflux disease without  esophagitis    Mixed hyperlipidemia    Osteoarthritis of right knee    Simple chronic bronchitis    Tobacco use disorder    Annual visit for general adult medical examination with abnormal findings    Encounter for screening for malignant neoplasm of breast    Dense breast    Menopausal syndrome    Colon cancer screening    Cervical cancer screening    Class 1 obesity due to excess calories with serious comorbidity and body mass index (BMI) of 31.0 to 31.9 in adult    Costochondral chest pain    Other fatigue    Nondisplaced fracture of lateral malleolus of left fibula, initial encounter for closed fracture    Acute pain of left knee    H/O: hysterectomy    Lung cancer screening declined by patient    Chronic nonseasonal allergic rhinitis due to pollen    Leg swelling    Hypertension    Tendinitis of left rotator cuff    Acute bacterial bronchitis    Acute left-sided low back pain with left-sided sciatica    Skin tag         Current Outpatient Medications:     albuterol sulfate  (90 Base) MCG/ACT inhaler, INHALE 2 PUFFS BY MOUTH EVERY 4 HOURS AS NEEDED FOR WHEEZING, Disp: 8.5 g, Rfl: 0    aspirin 81 MG chewable tablet, Chew 1 tablet Daily., Disp: , Rfl:     cyclobenzaprine (FLEXERIL) 10 MG tablet, Take 0.5-1 tablets by mouth At Night As Needed for Muscle Spasms., Disp: 30 tablet, Rfl: 2    Loratadine (Claritin) 10 MG chewable tablet, Chew., Disp: , Rfl:     omeprazole (priLOSEC) 40 MG capsule, TAKE 1 CAPSULE BY MOUTH DAILY, Disp: 30 capsule, Rfl: 12    rosuvastatin (CRESTOR) 20 MG tablet, Take 1 tablet by mouth Daily., Disp: 30 tablet, Rfl: 12    vitamin B-12 (CYANOCOBALAMIN) 500 MCG tablet, Take 1 tablet by mouth Daily. Vitamin B12 deficiency, Disp: , Rfl:     cephalexin (KEFLEX) 500 MG capsule, Take 1 capsule by mouth 3 (Three) Times a Day., Disp: 30 capsule, Rfl: 0    clindamycin (CLEOCIN) 300 MG capsule, Take 1 capsule by mouth 3 (Three) Times a Day. (Patient not taking: Reported on 10/22/2024), Disp: 45  "capsule, Rfl: 0         Review of Systems   Constitutional:  Negative for chills, diaphoresis and fever.   HENT:  Positive for postnasal drip. Negative for ear pain, sore throat, trouble swallowing and voice change.    Eyes:  Negative for visual disturbance.   Respiratory:  Positive for cough. Negative for shortness of breath.    Cardiovascular:  Negative for chest pain and palpitations.   Gastrointestinal:  Negative for abdominal pain and nausea.   Endocrine: Negative for polydipsia and polyphagia.   Genitourinary:  Negative for hematuria.   Musculoskeletal:  Negative for neck stiffness.   Skin:  Negative for color change and pallor.   Allergic/Immunologic: Negative for immunocompromised state.   Neurological:  Negative for seizures and syncope.   Hematological:  Negative for adenopathy.   Psychiatric/Behavioral:  Negative for sleep disturbance and suicidal ideas.        I have reviewed and confirmed the accuracy of the ROS as documented by the MA/LPN/RN Andrea Ennis MD      Objective   Ht 157.5 cm (62\")   Wt 73.9 kg (163 lb)   LMP  (LMP Unknown)   BMI 29.81 kg/m²   BP Readings from Last 3 Encounters:   10/14/24 112/82   09/24/24 118/80   07/30/24 124/90     Wt Readings from Last 3 Encounters:   10/22/24 73.9 kg (163 lb)   10/14/24 73.9 kg (163 lb)   09/24/24 74.8 kg (165 lb)     Physical Exam  Constitutional:       Appearance: Normal appearance.   Neurological:      Mental Status: She is alert.         Data / Lab Results:    No results found for: \"HGBA1C\"     Lab Results   Component Value Date     (H) 11/20/2023     (H) 06/21/2022     (H) 06/14/2021     Lab Results   Component Value Date    CHOL 194 06/03/2019    CHOL 196 02/13/2019    CHOL 205 (H) 11/19/2018     Lab Results   Component Value Date    TRIG 154 (H) 11/20/2023    TRIG 202 (H) 06/21/2022    TRIG 147 06/14/2021     Lab Results   Component Value Date    HDL 45 11/20/2023    HDL 42 06/21/2022    HDL 44 06/14/2021     No results " "found for: \"PSA\"  Lab Results   Component Value Date    WBC 8.1 09/24/2024    HGB 14.3 09/24/2024    HCT 43.7 09/24/2024    MCV 92 09/24/2024     09/24/2024     Lab Results   Component Value Date    TSH 0.521 11/20/2023    J0GPAFO 1.19 08/04/2016    THYROIDAB <9 12/17/2020     Lab Results   Component Value Date    GLUCOSE 94 09/24/2024    BUN 13 09/24/2024    CREATININE 0.70 09/24/2024    EGFRIFNONA 72 06/14/2021    EGFRIFAFRI 83 06/14/2021    BCR 19 09/24/2024    K 4.7 09/24/2024    CO2 23 09/24/2024    CALCIUM 9.4 09/24/2024    PROTENTOTREF 6.7 09/24/2024    ALBUMIN 4.2 09/24/2024    LABIL2 1.9 11/20/2023    AST 11 09/24/2024    ALT 9 09/24/2024     No results found for: \"MAKSIM\", \"RF\", \"SEDRATE\"   No results found for: \"CRP\"   No results found for: \"IRON\", \"TIBC\", \"FERRITIN\"   Lab Results   Component Value Date    [id number] 820 11/20/2023        Tara Cisse consented to undergo a video visit today.  This format was necessitated by the current covid-19 virus pandemic.  During the encounter I was located in the office, Tara Cisse was located in their personal residence.      Assessment & Plan      Medications        Problem List         LOS  Diverticulitis.  Improved/resolved, benign exam today.  Has completed course clindamycin.  White blood cell count normal.  Increase fiber intake/add Metamucil.  Has colonoscopy upcoming.  Call return if fever or recurrent symptoms..  Skin tag eye.  Symptomatic.  Referral to Dr. Dasia Montiel scheduled.  Seborrheic keratoses.  Improved/resolving today status post freezing.  Multiple legs, symptomatic, frozen today.  Topical care discussed.  Expected course discussed.  Consider repeat freezing, resection if worsening or persistent symptoms.  Health maintenance.  Doing well, vaccines current.  COVID-vaccine declined.  Discussed coated baby aspirin daily.  Discussed health maintenance, screening test, lifestyle modification.  Mammogram benign 8/22, recheck.  Acute " bacterial bronchitis..  Increase fluid intake.  Start antibiotics.  Call return if fever, respiratory difficulty, worsening symptoms.  Knee pain.    Much improved today status post knee injection.  Chronic/recurrent, likely secondary to OA.  X-ray benign 2019.  Has had orthopedics eval, good response to steroid injection, repeated 4/21..  Doing well on gabapentin, taking Rx infrequently..  Ice, rehabilitation exercises consider orthopedics follow-up if persistent symptoms.  Claudication.  Clinically improved today, VINNIE normal.  Stop smoking.  Allergic rhinitis.  History of elevated blood pressure with Claritin-D, okay to reattempt plain Claritin.  Start nasal steroids. Call return persistent symptoms.  Tobacco use.  Encourage cessation.  Has cut back.  Recommend DEXA scan she states she will consider , low-dose CT scan lung cancer screening scheduled.  Recommend carotid Doppler she states she will consider.    Hyperlipidemia.  Persistent elevation on  increased dose simvastatin, change to rosuvastatin.    Persistent elevation LDL, discussed diet, exercise, lifestyle modification, recheck 1 year.  Consider change to atorvastatin if persistent elevation.  Cardiac stress testing benign 2019 (treadmill cardiolyte).  Colon cancer screening: Recommend colonoscopy she agrees this year, [facility] referral scheduled.  Actinic keratosis.  Right hand.  Imporved / resolved today s/p freezing. Topical care discussed.  Consider repeat freezing resection if persistent symptoms.  Rotator cuff tendinitis.  Strain.  Overall benign exam today with some left trapezius spasm.  Ice, LSATs, rehabilitation exercise discussed.  Start nightly muscle relaxant.  Consider joint injection if persistent symptoms.  Low back pain.  Overall benign exam today, left lower extremity radiculopathy, likely secondary lumbar disc disease.  Intermittent symptoms, infrequent episodes currently.  Continue NSAIDs, she does not tolerate prednisone.  Start as needed  nightly muscle relaxant, rehabilitation exercise discussed.  PT referral declined.  Follow-up recheck.  Consider imaging if worsening symptoms.        Diagnoses and all orders for this visit:    1. Acute cough (Primary)    2. Tobacco use disorder    3. BMI 29.0-29.9,adult    4. Acute bacterial bronchitis        BMI is >= 25 and <30. (Overweight) The following options were offered after discussion;: exercise counseling/recommendations and nutrition counseling/recommendations       Expected course, medications, and adverse effects discussed.  Call or return if worsening or persistent symptoms.  The complete contents of the Assessment and Plan and Data / Lab Results as documented above have been reviewed and addressed by myself with the patient today as part of an ongoing evaluation / treatment plan.  If some of the documentation has been copied from a previous note and is unchanged it indicates that this problem / plan has been assessed today but is stable from a previous visit and no changes have been recommended.

## 2024-11-07 DIAGNOSIS — K21.9 GASTROESOPHAGEAL REFLUX DISEASE WITHOUT ESOPHAGITIS: ICD-10-CM

## 2024-11-08 RX ORDER — OMEPRAZOLE 40 MG/1
40 CAPSULE, DELAYED RELEASE ORAL DAILY
Qty: 30 CAPSULE | Refills: 12 | Status: SHIPPED | OUTPATIENT
Start: 2024-11-08

## 2024-12-07 DIAGNOSIS — E78.2 MIXED HYPERLIPIDEMIA: ICD-10-CM

## 2024-12-09 ENCOUNTER — ON CAMPUS - OUTPATIENT (AMBULATORY)
Dept: URBAN - METROPOLITAN AREA HOSPITAL 2 | Facility: HOSPITAL | Age: 64
End: 2024-12-09
Payer: COMMERCIAL

## 2024-12-09 ENCOUNTER — OFFICE (AMBULATORY)
Dept: URBAN - METROPOLITAN AREA PATHOLOGY 19 | Facility: PATHOLOGY | Age: 64
End: 2024-12-09
Payer: COMMERCIAL

## 2024-12-09 VITALS
SYSTOLIC BLOOD PRESSURE: 91 MMHG | SYSTOLIC BLOOD PRESSURE: 114 MMHG | SYSTOLIC BLOOD PRESSURE: 96 MMHG | OXYGEN SATURATION: 99 % | SYSTOLIC BLOOD PRESSURE: 117 MMHG | HEART RATE: 62 BPM | DIASTOLIC BLOOD PRESSURE: 60 MMHG | SYSTOLIC BLOOD PRESSURE: 130 MMHG | HEIGHT: 62 IN | SYSTOLIC BLOOD PRESSURE: 107 MMHG | DIASTOLIC BLOOD PRESSURE: 69 MMHG | DIASTOLIC BLOOD PRESSURE: 75 MMHG | DIASTOLIC BLOOD PRESSURE: 52 MMHG | SYSTOLIC BLOOD PRESSURE: 105 MMHG | SYSTOLIC BLOOD PRESSURE: 131 MMHG | DIASTOLIC BLOOD PRESSURE: 56 MMHG | HEART RATE: 84 BPM | TEMPERATURE: 96.1 F | HEART RATE: 85 BPM | HEART RATE: 80 BPM | DIASTOLIC BLOOD PRESSURE: 68 MMHG | HEART RATE: 71 BPM | HEART RATE: 65 BPM | DIASTOLIC BLOOD PRESSURE: 65 MMHG | DIASTOLIC BLOOD PRESSURE: 71 MMHG | HEART RATE: 88 BPM | RESPIRATION RATE: 16 BRPM | HEART RATE: 76 BPM | OXYGEN SATURATION: 98 % | RESPIRATION RATE: 18 BRPM | WEIGHT: 154 LBS | SYSTOLIC BLOOD PRESSURE: 104 MMHG | OXYGEN SATURATION: 100 % | SYSTOLIC BLOOD PRESSURE: 95 MMHG

## 2024-12-09 DIAGNOSIS — Z12.11 ENCOUNTER FOR SCREENING FOR MALIGNANT NEOPLASM OF COLON: ICD-10-CM

## 2024-12-09 DIAGNOSIS — K57.30 DIVERTICULOSIS OF LARGE INTESTINE WITHOUT PERFORATION OR ABS: ICD-10-CM

## 2024-12-09 DIAGNOSIS — K64.0 FIRST DEGREE HEMORRHOIDS: ICD-10-CM

## 2024-12-09 DIAGNOSIS — K63.3 ULCER OF INTESTINE: ICD-10-CM

## 2024-12-09 DIAGNOSIS — K52.9 NONINFECTIVE GASTROENTERITIS AND COLITIS, UNSPECIFIED: ICD-10-CM

## 2024-12-09 DIAGNOSIS — E78.2 MIXED HYPERLIPIDEMIA: ICD-10-CM

## 2024-12-09 PROCEDURE — 45380 COLONOSCOPY AND BIOPSY: CPT | Mod: 33 | Performed by: INTERNAL MEDICINE

## 2024-12-09 PROCEDURE — 88305 TISSUE EXAM BY PATHOLOGIST: CPT | Performed by: PATHOLOGY

## 2024-12-09 RX ORDER — ROSUVASTATIN CALCIUM 20 MG/1
20 TABLET, COATED ORAL DAILY
Qty: 30 TABLET | Refills: 12 | Status: SHIPPED | OUTPATIENT
Start: 2024-12-09 | End: 2024-12-09 | Stop reason: SDUPTHER

## 2024-12-09 RX ORDER — ROSUVASTATIN CALCIUM 20 MG/1
20 TABLET, COATED ORAL DAILY
Qty: 30 TABLET | Refills: 12 | Status: SHIPPED | OUTPATIENT
Start: 2024-12-09

## 2024-12-09 RX ADMIN — ONDANSETRON HYDROCHLORIDE 4 MG: 4 SOLUTION ORAL at 13:11

## 2024-12-30 ENCOUNTER — OFFICE VISIT (OUTPATIENT)
Dept: FAMILY MEDICINE CLINIC | Facility: CLINIC | Age: 64
End: 2024-12-30
Payer: COMMERCIAL

## 2024-12-30 VITALS
DIASTOLIC BLOOD PRESSURE: 86 MMHG | WEIGHT: 168.6 LBS | TEMPERATURE: 98.2 F | SYSTOLIC BLOOD PRESSURE: 126 MMHG | RESPIRATION RATE: 18 BRPM | HEART RATE: 96 BPM | HEIGHT: 62 IN | OXYGEN SATURATION: 97 % | BODY MASS INDEX: 31.03 KG/M2

## 2024-12-30 DIAGNOSIS — J06.9 ACUTE URI: ICD-10-CM

## 2024-12-30 DIAGNOSIS — F17.200 TOBACCO USE DISORDER: ICD-10-CM

## 2024-12-30 DIAGNOSIS — Z12.11 COLON CANCER SCREENING: ICD-10-CM

## 2024-12-30 DIAGNOSIS — R91.8 LUNG NODULES: ICD-10-CM

## 2024-12-30 DIAGNOSIS — E78.2 MIXED HYPERLIPIDEMIA: ICD-10-CM

## 2024-12-30 DIAGNOSIS — I10 HYPERTENSION, UNSPECIFIED TYPE: ICD-10-CM

## 2024-12-30 DIAGNOSIS — B96.89 ACUTE BACTERIAL BRONCHITIS: ICD-10-CM

## 2024-12-30 DIAGNOSIS — Z00.01 ANNUAL VISIT FOR GENERAL ADULT MEDICAL EXAMINATION WITH ABNORMAL FINDINGS: Primary | ICD-10-CM

## 2024-12-30 DIAGNOSIS — Z12.31 SCREENING MAMMOGRAM FOR BREAST CANCER: ICD-10-CM

## 2024-12-30 DIAGNOSIS — J20.8 ACUTE BACTERIAL BRONCHITIS: ICD-10-CM

## 2024-12-30 DIAGNOSIS — Z12.2 SCREENING FOR LUNG CANCER: ICD-10-CM

## 2024-12-30 DIAGNOSIS — Z12.4 CERVICAL CANCER SCREENING: ICD-10-CM

## 2024-12-30 LAB
BILIRUB BLD-MCNC: NEGATIVE MG/DL
CLARITY, POC: CLEAR
COLOR UR: YELLOW
GLUCOSE UR STRIP-MCNC: NEGATIVE MG/DL
KETONES UR QL: NEGATIVE
LEUKOCYTE EST, POC: ABNORMAL
NITRITE UR-MCNC: NEGATIVE MG/ML
PH UR: 6 [PH] (ref 5–8)
PROT UR STRIP-MCNC: ABNORMAL MG/DL
RBC # UR STRIP: ABNORMAL /UL
SP GR UR: 1.01 (ref 1–1.03)
UROBILINOGEN UR QL: ABNORMAL

## 2024-12-30 PROCEDURE — 99213 OFFICE O/P EST LOW 20 MIN: CPT | Performed by: FAMILY MEDICINE

## 2024-12-30 PROCEDURE — 99396 PREV VISIT EST AGE 40-64: CPT | Performed by: FAMILY MEDICINE

## 2024-12-30 PROCEDURE — 81003 URINALYSIS AUTO W/O SCOPE: CPT | Performed by: FAMILY MEDICINE

## 2024-12-30 RX ORDER — CEPHALEXIN 500 MG/1
500 CAPSULE ORAL 3 TIMES DAILY
Qty: 30 CAPSULE | Refills: 0 | Status: SHIPPED | OUTPATIENT
Start: 2024-12-30

## 2024-12-30 NOTE — PROGRESS NOTES
Subjective   Tara Cisse is a 64 y.o. female.     Chief Complaint   Patient presents with    Annual Exam    Hypertension    Hyperlipidemia    Insomnia              The patient is here: to discuss health maintenance and disease prevention to follow up on multiple medical problems.  Last comprehensive physical was on 11/27/2023.  Previous physical was performed by  Andrea Ennis MD  Overall has: moderate activity with work/home activities, poor appetite, good energy level, and is sleeping poorly. Nutrition: eating a variety of foods. Last tetanus shot was  8/12/2021 . Patient is doing routine self breast exams: monthly    History of Present Illness  Patient complains of trouble sleeping.  This began 3 weeks ago. Patient describes symptoms as early morning awakening and difficulty falling asleep. Paient has not attempted self treatment. Associated symptoms include: anxiety, fatigue, and irritability. Patient denies leg cramps, restless legs, and snoring. Symptoms have gradually worsened. Patient recently stopped smoking    Hypertension  This is a chronic problem. The current episode started more than 1 year ago. Associated symptoms include malaise/fatigue. Pertinent negatives include no anxiety, blurred vision, chest pain, headaches, neck pain, orthopnea, palpitations, peripheral edema, PND, shortness of breath (she has COPD ) or sweats. Risk factors for coronary artery disease include obesity, post-menopausal state, family history and dyslipidemia. Current antihypertension treatment includes nothing. The current treatment provides mild improvement. There is no history of angina, kidney disease, CAD/MI, CVA, heart failure, left ventricular hypertrophy, PVD or retinopathy. There is no history of chronic renal disease, coarctation of the aorta, hyperaldosteronism, hypercortisolism, hyperparathyroidism, a hypertension causing med, pheochromocytoma, renovascular disease, sleep apnea or a thyroid problem.    Hyperlipidemia  This is a chronic problem. The current episode started more than 1 year ago. Recent lipid tests were reviewed and are high. Exacerbating diseases include obesity. She has no history of chronic renal disease, diabetes, hypothyroidism, liver disease or nephrotic syndrome. Associated symptoms include leg pain. Pertinent negatives include no chest pain, focal sensory loss, focal weakness, myalgias or shortness of breath (she has COPD ). Current antihyperlipidemic treatment includes statins. Risk factors for coronary artery disease include hypertension, dyslipidemia and obesity.   Insomnia  Symptoms are new.   Onset was 1 to 4 weeks.   Symptoms occur daily.   Symptoms have been worse since onset.   Symptoms include fatigue.    Pertinent negative symptoms include no abdominal pain, no chest pain, no chills, no diaphoresis, no headaches, no myalgias, no nausea, no neck pain and no vomiting.   Treatments tried include nothing.        Recent Hospitalizations:  No hospitalization(s) within the last year..  ccc      I personally reviewed and updated the patient's allergies, medications, problem list, and past medical, surgical, social, and family history. I have reviewed and confirmed the accuracy of the HPI and ROS as documented by the MA/LPN/RN Andrea Ennis MD    Family History   Problem Relation Age of Onset    Hyperlipidemia Mother     Diabetes Father     Heart failure Father     Hyperlipidemia Father     Heart failure Paternal Grandmother     Breast cancer Neg Hx     Ovarian cancer Neg Hx        Social History     Tobacco Use    Smoking status: Former     Current packs/day: 0.00     Average packs/day: 1 pack/day for 44.9 years (44.9 ttl pk-yrs)     Types: Cigarettes     Start date: 1980     Quit date: 2024     Years since quittin.0     Passive exposure: Never    Smokeless tobacco: Never   Vaping Use    Vaping status: Former    Start date: 2018    Quit date: 5/15/2018    Substances:  Nicotine    Devices: Disposable   Substance Use Topics    Alcohol use: No    Drug use: Never     Comment: gabapentin for knee prn       Past Surgical History:   Procedure Laterality Date    ESSURE TUBAL LIGATION Bilateral     TOTAL ABDOMINAL HYSTERECTOMY      ovaries remain, benign reasons, bleeding       Patient Active Problem List   Diagnosis    Anemia due to vitamin B12 deficiency    Vitamin B 12 deficiency    Depressive disorder    Gastroesophageal reflux disease without esophagitis    Mixed hyperlipidemia    Osteoarthritis of right knee    Simple chronic bronchitis    Tobacco use disorder    Annual visit for general adult medical examination with abnormal findings    Encounter for screening for malignant neoplasm of breast    Dense breast    Menopausal syndrome    Colon cancer screening    Cervical cancer screening    Class 1 obesity due to excess calories with serious comorbidity and body mass index (BMI) of 31.0 to 31.9 in adult    Costochondral chest pain    Other fatigue    Nondisplaced fracture of lateral malleolus of left fibula, initial encounter for closed fracture    Acute pain of left knee    H/O: hysterectomy    Lung cancer screening declined by patient    Chronic nonseasonal allergic rhinitis due to pollen    Leg swelling    Hypertension    Tendinitis of left rotator cuff    Acute bacterial bronchitis    Acute left-sided low back pain with left-sided sciatica    Skin tag         Current Outpatient Medications:     albuterol sulfate  (90 Base) MCG/ACT inhaler, INHALE 2 PUFFS BY MOUTH EVERY 4 HOURS AS NEEDED FOR WHEEZING, Disp: 8.5 g, Rfl: 0    cyclobenzaprine (FLEXERIL) 10 MG tablet, Take 0.5-1 tablets by mouth At Night As Needed for Muscle Spasms., Disp: 30 tablet, Rfl: 2    Loratadine (Claritin) 10 MG chewable tablet, Chew., Disp: , Rfl:     omeprazole (priLOSEC) 40 MG capsule, TAKE 1 CAPSULE BY MOUTH DAILY, Disp: 30 capsule, Rfl: 12    rosuvastatin (CRESTOR) 20 MG tablet, Take 1 tablet by  "mouth Daily., Disp: 30 tablet, Rfl: 12    vitamin B-12 (CYANOCOBALAMIN) 500 MCG tablet, Take 1 tablet by mouth Daily. Vitamin B12 deficiency, Disp: , Rfl:     cephalexin (KEFLEX) 500 MG capsule, Take 1 capsule by mouth 3 (Three) Times a Day., Disp: 30 capsule, Rfl: 0    Review of Systems   Constitutional:  Positive for fatigue and malaise/fatigue. Negative for chills and diaphoresis.   HENT:  Negative for trouble swallowing and voice change.    Eyes:  Negative for blurred vision and visual disturbance.   Respiratory:  Negative for shortness of breath (she has COPD ).    Cardiovascular:  Negative for chest pain, palpitations, orthopnea and PND.   Gastrointestinal:  Negative for abdominal pain, nausea and vomiting.   Endocrine: Negative for polydipsia and polyphagia.   Genitourinary:  Negative for hematuria.   Musculoskeletal:  Negative for myalgias, neck pain and neck stiffness.   Skin:  Negative for color change and pallor.   Allergic/Immunologic: Negative for immunocompromised state.   Neurological:  Negative for focal weakness, seizures and syncope.   Hematological:  Negative for adenopathy.   Psychiatric/Behavioral:  Negative for sleep disturbance and suicidal ideas. The patient has insomnia.        I have reviewed and confirmed the accuracy of the ROS as documented by the MA/LPN/RN Andrea Ennis MD      Objective   /86   Pulse 96   Temp 98.2 °F (36.8 °C) (Temporal)   Resp 18   Ht 157.5 cm (62\")   Wt 76.5 kg (168 lb 9.6 oz)   LMP  (LMP Unknown)   SpO2 97%   BMI 30.84 kg/m²   BP Readings from Last 3 Encounters:   12/30/24 126/86   10/14/24 112/82   09/24/24 118/80     Wt Readings from Last 3 Encounters:   12/30/24 76.5 kg (168 lb 9.6 oz)   10/22/24 73.9 kg (163 lb)   10/14/24 73.9 kg (163 lb)     Physical Exam  Constitutional:       Appearance: Normal appearance. She is well-developed. She is not diaphoretic.   HENT:      Head: Normocephalic and atraumatic.      Right Ear: Tympanic membrane, ear " "canal and external ear normal.      Left Ear: Tympanic membrane, ear canal and external ear normal.      Nose: Nose normal.      Mouth/Throat:      Mouth: Mucous membranes are moist.   Eyes:      General: Lids are normal.      Extraocular Movements: Extraocular movements intact.      Conjunctiva/sclera: Conjunctivae normal.      Pupils: Pupils are equal, round, and reactive to light.   Neck:      Thyroid: No thyromegaly.      Vascular: No carotid bruit or JVD.      Trachea: No tracheal deviation.   Cardiovascular:      Rate and Rhythm: Normal rate and regular rhythm.      Heart sounds: Normal heart sounds. No murmur heard.     No friction rub. No gallop.   Pulmonary:      Effort: Pulmonary effort is normal.      Breath sounds: Normal breath sounds. No stridor. No decreased breath sounds, wheezing or rales.   Abdominal:      General: Bowel sounds are normal. There is no distension.      Palpations: Abdomen is soft. There is no mass.      Tenderness: There is no abdominal tenderness. There is no guarding or rebound.      Hernia: No hernia is present.   Lymphadenopathy:      Head:      Right side of head: No submental, submandibular, tonsillar, preauricular, posterior auricular or occipital adenopathy.      Left side of head: No submental, submandibular, tonsillar, preauricular, posterior auricular or occipital adenopathy.      Cervical: No cervical adenopathy.   Skin:     General: Skin is warm and dry.      Coloration: Skin is not pale.   Neurological:      Mental Status: She is alert and oriented to person, place, and time.      Cranial Nerves: No cranial nerve deficit.      Sensory: No sensory deficit.      Coordination: Coordination normal.      Gait: Gait normal.      Deep Tendon Reflexes: Reflexes are normal and symmetric.         Data / Lab Results:    No results found for: \"HGBA1C\"  Lab Results   Component Value Date    Glucose 91 11/25/2024    Glucose, UA Negative 12/30/2024     Lab Results   Component Value " "Date     (H) 11/25/2024     (H) 11/20/2023     (H) 06/21/2022     Lab Results   Component Value Date    CHOL 194 06/03/2019    CHOL 196 02/13/2019    CHOL 205 (H) 11/19/2018     Lab Results   Component Value Date    TRIG 136 11/25/2024    TRIG 154 (H) 11/20/2023    TRIG 202 (H) 06/21/2022     Lab Results   Component Value Date    HDL 50 11/25/2024    HDL 45 11/20/2023    HDL 42 06/21/2022     No results found for: \"PSA\"  Lab Results   Component Value Date    WBC 8.3 11/25/2024    HGB 14.2 11/25/2024    HCT 43.2 11/25/2024    MCV 93 11/25/2024     11/25/2024     Lab Results   Component Value Date    TSH 1.250 11/25/2024    D1NCKQD 1.19 08/04/2016    THYROIDAB <9 12/17/2020     Lab Results   Component Value Date    GLUCOSE 91 11/25/2024    BUN 22 11/25/2024    CREATININE 0.81 11/25/2024    EGFRIFNONA 72 06/14/2021    EGFRIFAFRI 83 06/14/2021    BCR 27 11/25/2024    K 4.2 11/25/2024    CO2 23 11/25/2024    CALCIUM 9.4 11/25/2024    PROTENTOTREF 6.6 11/25/2024    ALBUMIN 4.4 11/25/2024    LABIL2 1.9 11/20/2023    AST 17 11/25/2024    ALT 19 11/25/2024     No results found for: \"MAKSIM\", \"RF\", \"SEDRATE\"   No results found for: \"CRP\"   No results found for: \"IRON\", \"TIBC\", \"FERRITIN\"   Lab Results   Component Value Date    QAHZYSWI17 820 11/20/2023        Age-appropriate Screening Schedule:  Refer to the list below for future screening recommendations based on patient's age, sex and/or medical conditions. Orders for these recommended tests are listed in the plan section. The patient has been provided with a written plan.    Health Maintenance   Topic Date Due    ZOSTER VACCINE (1 of 2) Never done    Pneumococcal Vaccine 0-64 (2 of 2 - PCV) 11/19/2019    COVID-19 Vaccine (1 - 2024-25 season) Never done    LUNG CANCER SCREENING  12/19/2024    LIPID PANEL  11/25/2025    MAMMOGRAM  12/15/2025    ANNUAL PHYSICAL  12/30/2025    BMI FOLLOWUP  12/30/2025    TDAP/TD VACCINES (3 - Td or Tdap) 08/12/2031    " COLORECTAL CANCER SCREENING  12/09/2034    HEPATITIS C SCREENING  Completed    PAP SMEAR  Discontinued           Assessment & Plan      Medications        Problem List         LOS      Physical.  Doing well, vaccines current.  COVID-vaccine declined.  Off coated baby aspirin daily 2nd colon ulcers.  .  Discussed health maintenance, screening test, lifestyle modification.  Mammogram benign 8/22, 2023, recheck.  Acute bacterial sinusitis...  Increase fluid intake.  Start antibiotics.  Call return if fever, respiratory difficulty, worsening symptoms.  Knee pain.    Much improved today status post knee injection.  Chronic/recurrent, likely secondary to OA.  X-ray benign 2019.  Has had orthopedics eval, good response to steroid injection, repeated 4/21..  Doing well on gabapentin, taking Rx infrequently..  Ice, rehabilitation exercises consider orthopedics follow-up if persistent symptoms.  Claudication.  Clinically improved today, VINNIE normal.  Stop smoking.  Allergic rhinitis.  History of elevated blood pressure with Claritin-D, okay to reattempt plain Claritin.  Start nasal steroids. Call return persistent symptoms.  Tobacco use.  Congrats on quitting 12/24.  Encourage cessation.  Has cut back.  Recommend DEXA scan she declined secondary to insurance would not cover, recommend low-dose CT scan lung cancer screening, recommend carotid Doppler she states she will call back to schedule..    Hyperlipidemia.  Improving today on rosuvastatin 20 mg daily..    Persistent elevation LDL, discussed diet, exercise, lifestyle modification, recheck 1 year.  Consider change to atorvastatin if persistent elevation.  Cardiac stress testing benign 2019 (treadmill cardiolyte)  Colon cancer screening.  Colonoscopy 2024 negative for polyps, with pericecal ulceration off ASA, with moderate pancolonic diverticulosis only, followed by [facility] Dr. Clemente.  Actinic keratosis.  Right hand.  Imporved / resolved today s/p freezing. Topical care  discussed.  Consider repeat freezing resection if persistent symptoms.  Rotator cuff tendinitis.  Strain.  Overall benign exam today with some left trapezius spasm.  Ice, LSATs, rehabilitation exercise discussed.  Start nightly muscle relaxant.  Consider joint injection if persistent symptoms.  Low back pain.  Overall benign exam today, left lower extremity radiculopathy, likely secondary lumbar disc disease.  Intermittent symptoms, infrequent episodes currently.  Continue NSAIDs, she does not tolerate prednisone.  Start as needed nightly muscle relaxant, rehabilitation exercise discussed.  PT referral declined.  Follow-up recheck.  Consider imaging if worsening symptoms.  Abnormal colonoscopy.  Colonoscopy 2024 with moderate pancolonic diverticulosis, pericecal ulceration pericecal ulceration with pathology showing acute colitis, followed by [facility] Dr. Clemente.  ASA has been discontinued.  Diverticulitis.  Improved/resolved, benign exam today.  Has completed course clindamycin.  White blood cell count normal.  Increase fiber intake/add Metamucil.  Follow-up colonoscopy with colonic diverticulosis, moderate, followed by [facility] Dr. Clemente.  Call return if fever or recurrent symptoms..  Skin tag eye.  Symptomatic.  Referral to Dr. Dasia Montiel scheduled.  Seborrheic keratoses.  Improved/resolving today status post freezing.  Multiple legs, symptomatic, frozen today.  Topical care discussed.  Expected course discussed.  Consider repeat freezing, resection if worsening or persistent symptoms.  Previous Exams:  Mammogram was on 12/15/2023 ordered by Dr Ennis.     No mammographic signs of malignancy. Recommend routine mammographic  screening.    BI-RADS ASSESSMENT: BI-RADS 1. Negative.    Recommended repeat in 1 year  Colonoscopy was on 12/9/2024 by Dr Galarza.     Ulcers in the cecum and ascending colon (biopsy)    Moderate diverticulosis of the transverse colon, descending colon and sigmoid colon    Tight fixed  turn in the sigmoid colon that required the pediatric colonoscope to traverse    Grade/Stage I Internal hemorrhoids     Recommended repeat in 10 years  Stress Test was on 2/15/2019     No evidence for reversible myocardial ischemia noted.    Normal left ventricular ejection fraction of  73 %.  EKG was on 4/15/2021  CT Chest was on 12/18/2023 ordered by Dr Ennis.     Few juxtapleural pulmonary nodules measuring up to 6 mm in diameter    Recommended repeat in 1 year        Diagnoses and all orders for this visit:    1. Annual visit for general adult medical examination with abnormal findings (Primary)  -     POCT urinalysis dipstick, multipro  -     CBC & Differential; Future  -     Comprehensive Metabolic Panel; Future  -     TSH; Future  -     Lipid Panel With / Chol / HDL Ratio; Future    2. Hypertension, unspecified type  -     CBC & Differential; Future  -     Comprehensive Metabolic Panel; Future  -     TSH; Future  -     Lipid Panel With / Chol / HDL Ratio; Future    3. Mixed hyperlipidemia  -     CBC & Differential; Future  -     Comprehensive Metabolic Panel; Future  -     TSH; Future  -     Lipid Panel With / Chol / HDL Ratio; Future    4. BMI 29.0-29.9,adult    5. Tobacco use disorder    6. Colon cancer screening    7. Cervical cancer screening    8. Screening mammogram for breast cancer    9. Lung nodules    10. Screening for lung cancer    11. Acute URI  -     cephalexin (KEFLEX) 500 MG capsule; Take 1 capsule by mouth 3 (Three) Times a Day.  Dispense: 30 capsule; Refill: 0    12. Acute bacterial bronchitis      BMI is >= 25 and <30. (Overweight) The following options were offered after discussion;: exercise counseling/recommendations and nutrition counseling/recommendations          Expected course, medications, and adverse effects discussed.  Call or return if worsening or persistent symptoms.  I wore protective equipment throughout this patient encounter including a mask, gloves, and eye protection.   Hand hygiene was performed before donning protective equipment and after removal when leaving the room. The complete contents of the Assessment and Plan and Data / Lab Results as documented above have been reviewed and addressed by myself with the patient today as part of an ongoing evaluation / treatment plan.  If some of the documentation has been copied from a previous note and is unchanged it indicates that this problem / plan has been assessed today but is stable from a previous visit and no changes have been recommended.  The separate E/M service provided today is significant, medically necessary, and separately identifiable.

## 2025-03-03 ENCOUNTER — TELEPHONE (OUTPATIENT)
Dept: FAMILY MEDICINE CLINIC | Facility: CLINIC | Age: 65
End: 2025-03-03
Payer: COMMERCIAL

## 2025-03-03 DIAGNOSIS — E78.2 MIXED HYPERLIPIDEMIA: ICD-10-CM

## 2025-03-03 RX ORDER — ROSUVASTATIN CALCIUM 20 MG/1
20 TABLET, COATED ORAL DAILY
Qty: 30 TABLET | Refills: 12 | Status: SHIPPED | OUTPATIENT
Start: 2025-03-03 | End: 2025-03-03

## 2025-03-03 RX ORDER — ROSUVASTATIN CALCIUM 20 MG/1
20 TABLET, COATED ORAL DAILY
Qty: 90 TABLET | Refills: 3 | Status: SHIPPED | OUTPATIENT
Start: 2025-03-03

## 2025-03-03 NOTE — TELEPHONE ENCOUNTER
PATIENT'S SPOUSE CALLED FOR MED REFILL OF    rosuvastatin (CRESTOR) 20 MG tablet   SHE HAS 7 DAYS LEFT    [facility]  [address] [place], [place] - [address] [facility] [address]  - [phone] [facility] [phone]  [phone]     CALL BACK NUMBER [phone]    90 DAY SUPPLY

## 2025-03-11 ENCOUNTER — TELEPHONE (OUTPATIENT)
Dept: FAMILY MEDICINE CLINIC | Facility: CLINIC | Age: 65
End: 2025-03-11
Payer: COMMERCIAL

## 2025-03-11 NOTE — TELEPHONE ENCOUNTER
I attempted to call patient with no answer. Left VM for patient to return call.     Patient seen on 12/30/24 for Physical. Patient would like to wait til 2-3 months to order Mammogram, CT Chest Low Dose, and Carotid Dopplers. I was calling to see if patient is okay with me ordering these